# Patient Record
Sex: MALE | Race: ASIAN | NOT HISPANIC OR LATINO
[De-identification: names, ages, dates, MRNs, and addresses within clinical notes are randomized per-mention and may not be internally consistent; named-entity substitution may affect disease eponyms.]

---

## 2017-05-16 ENCOUNTER — NON-APPOINTMENT (OUTPATIENT)
Age: 72
End: 2017-05-16

## 2017-05-16 ENCOUNTER — APPOINTMENT (OUTPATIENT)
Dept: ELECTROPHYSIOLOGY | Facility: CLINIC | Age: 72
End: 2017-05-16

## 2017-05-16 VITALS — WEIGHT: 225 LBS | HEART RATE: 59 BPM | HEIGHT: 69 IN | BODY MASS INDEX: 33.33 KG/M2 | OXYGEN SATURATION: 96 %

## 2017-05-16 VITALS — SYSTOLIC BLOOD PRESSURE: 138 MMHG | DIASTOLIC BLOOD PRESSURE: 83 MMHG

## 2017-11-14 ENCOUNTER — NON-APPOINTMENT (OUTPATIENT)
Age: 72
End: 2017-11-14

## 2017-11-14 ENCOUNTER — APPOINTMENT (OUTPATIENT)
Dept: ELECTROPHYSIOLOGY | Facility: CLINIC | Age: 72
End: 2017-11-14
Payer: COMMERCIAL

## 2017-11-14 VITALS
BODY MASS INDEX: 33.33 KG/M2 | HEART RATE: 54 BPM | SYSTOLIC BLOOD PRESSURE: 157 MMHG | OXYGEN SATURATION: 98 % | HEIGHT: 69 IN | DIASTOLIC BLOOD PRESSURE: 89 MMHG | WEIGHT: 225 LBS | RESPIRATION RATE: 16 BRPM

## 2017-11-14 PROCEDURE — 99213 OFFICE O/P EST LOW 20 MIN: CPT

## 2017-11-14 PROCEDURE — 93000 ELECTROCARDIOGRAM COMPLETE: CPT

## 2018-05-15 ENCOUNTER — NON-APPOINTMENT (OUTPATIENT)
Age: 73
End: 2018-05-15

## 2018-05-15 ENCOUNTER — APPOINTMENT (OUTPATIENT)
Dept: ELECTROPHYSIOLOGY | Facility: CLINIC | Age: 73
End: 2018-05-15
Payer: COMMERCIAL

## 2018-05-15 VITALS
HEIGHT: 70 IN | WEIGHT: 223 LBS | HEART RATE: 52 BPM | SYSTOLIC BLOOD PRESSURE: 161 MMHG | OXYGEN SATURATION: 96 % | RESPIRATION RATE: 16 BRPM | DIASTOLIC BLOOD PRESSURE: 80 MMHG | BODY MASS INDEX: 31.92 KG/M2

## 2018-05-15 VITALS — DIASTOLIC BLOOD PRESSURE: 74 MMHG | SYSTOLIC BLOOD PRESSURE: 159 MMHG

## 2018-05-15 PROCEDURE — 93000 ELECTROCARDIOGRAM COMPLETE: CPT

## 2018-05-15 PROCEDURE — 99214 OFFICE O/P EST MOD 30 MIN: CPT

## 2018-09-05 ENCOUNTER — MEDICATION RENEWAL (OUTPATIENT)
Age: 73
End: 2018-09-05

## 2018-11-13 ENCOUNTER — APPOINTMENT (OUTPATIENT)
Dept: ELECTROPHYSIOLOGY | Facility: CLINIC | Age: 73
End: 2018-11-13
Payer: COMMERCIAL

## 2018-11-13 ENCOUNTER — NON-APPOINTMENT (OUTPATIENT)
Age: 73
End: 2018-11-13

## 2018-11-13 VITALS
HEART RATE: 63 BPM | OXYGEN SATURATION: 96 % | WEIGHT: 213 LBS | DIASTOLIC BLOOD PRESSURE: 78 MMHG | SYSTOLIC BLOOD PRESSURE: 162 MMHG | HEIGHT: 70 IN | BODY MASS INDEX: 30.49 KG/M2

## 2018-11-13 PROCEDURE — 93000 ELECTROCARDIOGRAM COMPLETE: CPT

## 2018-11-13 PROCEDURE — 99213 OFFICE O/P EST LOW 20 MIN: CPT

## 2018-11-13 NOTE — PHYSICAL EXAM
[General Appearance - Well Developed] : well developed [Normal Appearance] : normal appearance [Well Groomed] : well groomed [General Appearance - Well Nourished] : well nourished [No Deformities] : no deformities [General Appearance - In No Acute Distress] : no acute distress [Normal Conjunctiva] : the conjunctiva exhibited no abnormalities [Eyelids - No Xanthelasma] : the eyelids demonstrated no xanthelasmas [Normal Oral Mucosa] : normal oral mucosa [No Oral Pallor] : no oral pallor [No Oral Cyanosis] : no oral cyanosis [Normal Jugular Venous A Waves Present] : normal jugular venous A waves present [Normal Jugular Venous V Waves Present] : normal jugular venous V waves present [No Jugular Venous Mayorga A Waves] : no jugular venous mayorga A waves [Respiration, Rhythm And Depth] : normal respiratory rhythm and effort [Exaggerated Use Of Accessory Muscles For Inspiration] : no accessory muscle use [Auscultation Breath Sounds / Voice Sounds] : lungs were clear to auscultation bilaterally [Heart Sounds] : normal S1 and S2 [Murmurs] : no murmurs present [Abdomen Soft] : soft [Abdomen Tenderness] : non-tender [Abdomen Mass (___ Cm)] : no abdominal mass palpated [Abnormal Walk] : normal gait [Gait - Sufficient For Exercise Testing] : the gait was sufficient for exercise testing [Nail Clubbing] : no clubbing of the fingernails [Cyanosis, Localized] : no localized cyanosis [Petechial Hemorrhages (___cm)] : no petechial hemorrhages [Skin Color & Pigmentation] : normal skin color and pigmentation [] : no rash [No Venous Stasis] : no venous stasis [Skin Lesions] : no skin lesions [No Skin Ulcers] : no skin ulcer [No Xanthoma] : no  xanthoma was observed [Oriented To Time, Place, And Person] : oriented to person, place, and time [Affect] : the affect was normal [Mood] : the mood was normal [No Anxiety] : not feeling anxious [FreeTextEntry1] : irregular rate/rhythm

## 2018-11-13 NOTE — DISCUSSION/SUMMARY
[FreeTextEntry1] : In summary, Tyler Mendenhall is a 72y/o man with Hx of permanent atrial fibrillation on Eliquis who presents today for routine f/u. Admits doing well with no issues or complaints. Denies chest pain, palpitations, SOB, syncope or near syncope. Blood pressure elevated in office but states typically normal without need for medication. Discussed importance of weight loss, heart healthy diet, and sodium restriction for further HTN management and will f/u with Dr. Sanders further. Will continue Eliquis 5mg BID as prescribed, Rx sent as requested, and RTO for f/u in 6 months.  Advised patient to take blood pressure at home.  If BP continues high, may need anti HTN medication. \par \par Sincerely,\par \par Pipe Saravia MD

## 2018-11-13 NOTE — HISTORY OF PRESENT ILLNESS
[FreeTextEntry1] : Sofy Sanders MD\par \par I saw Tyler Mendenhall on November 13, 2018. As you know, he is a 72y/o man with Hx of permanent atrial fibrillation on Eliquis who presents today for routine f/u. Admits doing well with no issues or complaints. Denies chest pain, palpitations, SOB, syncope or near syncope.

## 2019-05-14 ENCOUNTER — APPOINTMENT (OUTPATIENT)
Dept: ELECTROPHYSIOLOGY | Facility: CLINIC | Age: 74
End: 2019-05-14
Payer: COMMERCIAL

## 2019-05-14 ENCOUNTER — NON-APPOINTMENT (OUTPATIENT)
Age: 74
End: 2019-05-14

## 2019-05-14 VITALS
BODY MASS INDEX: 32.93 KG/M2 | DIASTOLIC BLOOD PRESSURE: 80 MMHG | HEIGHT: 70 IN | WEIGHT: 230 LBS | OXYGEN SATURATION: 98 % | SYSTOLIC BLOOD PRESSURE: 160 MMHG | HEART RATE: 47 BPM

## 2019-05-14 PROCEDURE — 99214 OFFICE O/P EST MOD 30 MIN: CPT

## 2019-05-14 PROCEDURE — 93000 ELECTROCARDIOGRAM COMPLETE: CPT

## 2019-05-14 NOTE — DISCUSSION/SUMMARY
[FreeTextEntry1] : In summary, Tyler Mendenhall is a 75y/o man with Hx of permanent atrial fibrillation on Eliquis who presents today for routine f/u. Admits doing well with no issues or complaints. Denies chest pain, palpitations, SOB, syncope or near syncope. Blood pressure elevated in office but states typically normal without need for medication. Discussed importance of weight loss, heart healthy diet, and sodium restriction for further HTN management and will f/u with Dr. Sanders further. Will continue Eliquis 5mg BID as prescribed, Rx sent as requested, and RTO for f/u in 6 months.  Advised patient to take blood pressure at home.  If BP continues high, may need anti HTN medication. \par \par Sincerely,\par \par Pipe Saravia MD

## 2019-05-14 NOTE — HISTORY OF PRESENT ILLNESS
[FreeTextEntry1] : Sofy Sanders MD\par \par I saw Tyler Mendenhall on May 14, 2019. As you know, he is a 75y/o man with Hx of permanent atrial fibrillation on Eliquis who presents today for routine f/u. Admits doing well with no issues or complaints. Denies chest pain, palpitations, SOB, syncope or near syncope.

## 2019-08-02 ENCOUNTER — APPOINTMENT (OUTPATIENT)
Dept: VASCULAR SURGERY | Facility: CLINIC | Age: 74
End: 2019-08-02
Payer: COMMERCIAL

## 2019-08-02 VITALS
TEMPERATURE: 97 F | HEIGHT: 70 IN | SYSTOLIC BLOOD PRESSURE: 175 MMHG | BODY MASS INDEX: 32.93 KG/M2 | WEIGHT: 230 LBS | DIASTOLIC BLOOD PRESSURE: 72 MMHG | HEART RATE: 46 BPM

## 2019-08-02 PROCEDURE — 93970 EXTREMITY STUDY: CPT

## 2019-08-02 PROCEDURE — 99202 OFFICE O/P NEW SF 15 MIN: CPT

## 2019-11-22 ENCOUNTER — NON-APPOINTMENT (OUTPATIENT)
Age: 74
End: 2019-11-22

## 2019-11-22 ENCOUNTER — APPOINTMENT (OUTPATIENT)
Dept: ELECTROPHYSIOLOGY | Facility: CLINIC | Age: 74
End: 2019-11-22
Payer: COMMERCIAL

## 2019-11-22 VITALS
HEIGHT: 70 IN | SYSTOLIC BLOOD PRESSURE: 148 MMHG | WEIGHT: 229 LBS | BODY MASS INDEX: 32.78 KG/M2 | DIASTOLIC BLOOD PRESSURE: 59 MMHG | OXYGEN SATURATION: 96 % | HEART RATE: 56 BPM

## 2019-11-22 PROCEDURE — 99213 OFFICE O/P EST LOW 20 MIN: CPT

## 2019-11-22 PROCEDURE — 93000 ELECTROCARDIOGRAM COMPLETE: CPT

## 2019-11-22 RX ORDER — FEXOFENADINE HYDROCHLORIDE 180 MG/1
180 TABLET, FILM COATED ORAL DAILY
Qty: 30 | Refills: 0 | Status: DISCONTINUED | COMMUNITY
Start: 2017-05-16 | End: 2019-11-22

## 2019-11-22 NOTE — PHYSICAL EXAM
[General Appearance - Well Developed] : well developed [Normal Appearance] : normal appearance [Well Groomed] : well groomed [General Appearance - Well Nourished] : well nourished [No Deformities] : no deformities [General Appearance - In No Acute Distress] : no acute distress [Normal Conjunctiva] : the conjunctiva exhibited no abnormalities [Eyelids - No Xanthelasma] : the eyelids demonstrated no xanthelasmas [Normal Oral Mucosa] : normal oral mucosa [No Oral Pallor] : no oral pallor [No Oral Cyanosis] : no oral cyanosis [Normal Jugular Venous A Waves Present] : normal jugular venous A waves present [Normal Jugular Venous V Waves Present] : normal jugular venous V waves present [No Jugular Venous Mayorga A Waves] : no jugular venous mayorga A waves [Respiration, Rhythm And Depth] : normal respiratory rhythm and effort [Exaggerated Use Of Accessory Muscles For Inspiration] : no accessory muscle use [Auscultation Breath Sounds / Voice Sounds] : lungs were clear to auscultation bilaterally [Heart Rate And Rhythm] : heart rate and rhythm were normal [Heart Sounds] : normal S1 and S2 [Murmurs] : no murmurs present [Abdomen Soft] : soft [Abdomen Tenderness] : non-tender [Abdomen Mass (___ Cm)] : no abdominal mass palpated [Abnormal Walk] : normal gait [Gait - Sufficient For Exercise Testing] : the gait was sufficient for exercise testing [Nail Clubbing] : no clubbing of the fingernails [Cyanosis, Localized] : no localized cyanosis [Petechial Hemorrhages (___cm)] : no petechial hemorrhages [Skin Color & Pigmentation] : normal skin color and pigmentation [] : no rash [No Venous Stasis] : no venous stasis [Skin Lesions] : no skin lesions [No Skin Ulcers] : no skin ulcer [No Xanthoma] : no  xanthoma was observed [Oriented To Time, Place, And Person] : oriented to person, place, and time [Affect] : the affect was normal [Mood] : the mood was normal [No Anxiety] : not feeling anxious

## 2019-11-23 NOTE — HISTORY OF PRESENT ILLNESS
[FreeTextEntry1] : Sofy Sanders MD\par \par Mr. Scott is a 75y/o man with Hx of permanent atrial fibrillation on Eliquis who presents today for routine f/u. Admits doing well with no issues or complaints. Denies chest pain, palpitations, SOB, syncope or near syncope.  EKG today Afib rate 50.

## 2019-11-23 NOTE — DISCUSSION/SUMMARY
[FreeTextEntry1] : Mr. Scott is a 73y/o man with Hx of permanent atrial fibrillation on Eliquis who presents today for routine f/u. Admits doing well with no issues or complaints. Denies chest pain, palpitations, SOB, syncope or near syncope.  EKG today Afib rate 50.  I recommended that he continue anticoagulation and follow-up in 6 months.  His rate is well controlled and he remains asymptomatic. \par \par Sincerely,\par \par Pipe Saravia MD

## 2019-12-06 ENCOUNTER — TRANSCRIPTION ENCOUNTER (OUTPATIENT)
Age: 74
End: 2019-12-06

## 2020-02-13 ENCOUNTER — RX RENEWAL (OUTPATIENT)
Age: 75
End: 2020-02-13

## 2020-05-22 ENCOUNTER — APPOINTMENT (OUTPATIENT)
Dept: ELECTROPHYSIOLOGY | Facility: CLINIC | Age: 75
End: 2020-05-22

## 2020-05-22 ENCOUNTER — APPOINTMENT (OUTPATIENT)
Dept: ELECTROPHYSIOLOGY | Facility: CLINIC | Age: 75
End: 2020-05-22
Payer: COMMERCIAL

## 2020-05-22 PROCEDURE — 99201 OFFICE OUTPATIENT NEW 10 MINUTES: CPT | Mod: 95

## 2020-05-22 NOTE — HISTORY OF PRESENT ILLNESS
[Home] : at home, [unfilled] , at the time of the visit. [Medical Office: ([facility])___] : at the medical office located in  [Verbal consent obtained from patient] : the patient, [unfilled] [Time Spent: ___ minutes] : I have spent [unfilled] minutes with the patient on the telephone [FreeTextEntry1] : Goes for walks, keeps walking no changes, but gets tired a little more easily. Patient's wife a radiology nurse at [facility] [employer] [facility] [employer] and takes patient's blood and will process at [facility] lab. He periodically travels to [place] where he has a place to stay.  He walks about an hour per day.  Neither he nor his wife developed COVID.  His weight has remained constant. We would like him to lose weight. 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 He likes to garden and spends his time now in the garden.  He is taking Eliquis and has no evidence of bleeding.  He follows with Dr. Sofy Sanders. I think his fatigue is in part related to his weight and atrial fibrillation.  Again, encourage weight loss.

## 2020-08-07 ENCOUNTER — APPOINTMENT (OUTPATIENT)
Dept: VASCULAR SURGERY | Facility: CLINIC | Age: 75
End: 2020-08-07

## 2020-11-23 ENCOUNTER — APPOINTMENT (OUTPATIENT)
Dept: VASCULAR SURGERY | Facility: CLINIC | Age: 75
End: 2020-11-23
Payer: COMMERCIAL

## 2020-11-23 PROCEDURE — 93970 EXTREMITY STUDY: CPT

## 2020-12-01 ENCOUNTER — APPOINTMENT (OUTPATIENT)
Dept: VASCULAR SURGERY | Facility: CLINIC | Age: 75
End: 2020-12-01
Payer: COMMERCIAL

## 2020-12-01 PROCEDURE — 99442: CPT

## 2021-04-13 ENCOUNTER — APPOINTMENT (OUTPATIENT)
Dept: ELECTROPHYSIOLOGY | Facility: CLINIC | Age: 76
End: 2021-04-13
Payer: COMMERCIAL

## 2021-04-13 ENCOUNTER — NON-APPOINTMENT (OUTPATIENT)
Age: 76
End: 2021-04-13

## 2021-04-13 VITALS
OXYGEN SATURATION: 100 % | WEIGHT: 229 LBS | BODY MASS INDEX: 32.86 KG/M2 | HEART RATE: 59 BPM | DIASTOLIC BLOOD PRESSURE: 71 MMHG | TEMPERATURE: 98.2 F | SYSTOLIC BLOOD PRESSURE: 157 MMHG | RESPIRATION RATE: 16 BRPM

## 2021-04-13 PROCEDURE — 93000 ELECTROCARDIOGRAM COMPLETE: CPT

## 2021-04-13 PROCEDURE — 99072 ADDL SUPL MATRL&STAF TM PHE: CPT

## 2021-04-13 PROCEDURE — 99213 OFFICE O/P EST LOW 20 MIN: CPT

## 2021-04-13 NOTE — HISTORY OF PRESENT ILLNESS
[FreeTextEntry1] : Sofy Sanders MD\par \par Tyler Mendenhall is a 75y/o man with Hx of permanent atrial fibrillation on Eliquis who presents today for routine f/u. Back in March 2021, was hospitalized at [place] for RLE bruising/bleeding. Thought to be venous and has now resolved. Still on Eliquis. Following with vascular. From a cardiac perspective, he has been doing well. Still having some dyspnea on exertion but staying active. Denies chest pain, palpitations, SOB, syncope or near syncope. Recently saw Dr. Sanders and was noted to be bradycardic on EKG, heart rate 48 bpm at that time. Denies any dizziness, feeling lightheaded, and no syncope or near syncope.

## 2021-04-13 NOTE — DISCUSSION/SUMMARY
[FreeTextEntry1] : Tyler Mendenhall is a 75y/o man with Hx of permanent atrial fibrillation on Eliquis who presents today for routine f/u. \par \par Impression:\par \par 1. Permanent afib: EKG performed today to assess for presence of afib and reveals afib with ventricular response rates in the 50s. Review of EKG with Dr. Pressley and reveals afib with rates in the 40s. Denies correlating symptoms. No dizziness or feeling lightheaded. Nos syncope or near syncope. Remains off AV nodals. Discussed s/s of symptomatic bradycardia and indication for PPM placement. For now, no need for PPM. \par \par 2. Venous insufficiency: recent hospitalization for RLE venous bleed. Will f/u with vascular. \par \par 3. High TSH. Patient will be seeing endocrinologist for further evaluation. \par \par Will continue f/u with Cardiologist and may RTO as needed or if any new or worsening symptoms or findings occur.\par \par Sincerely,\par \par Pipe Saravia MD

## 2021-05-26 ENCOUNTER — APPOINTMENT (OUTPATIENT)
Dept: ENDOCRINOLOGY | Facility: CLINIC | Age: 76
End: 2021-05-26

## 2021-06-10 ENCOUNTER — RX RENEWAL (OUTPATIENT)
Age: 76
End: 2021-06-10

## 2021-06-17 ENCOUNTER — APPOINTMENT (OUTPATIENT)
Dept: VASCULAR SURGERY | Facility: CLINIC | Age: 76
End: 2021-06-17
Payer: COMMERCIAL

## 2021-06-17 VITALS
HEIGHT: 70 IN | SYSTOLIC BLOOD PRESSURE: 172 MMHG | WEIGHT: 229 LBS | BODY MASS INDEX: 32.78 KG/M2 | TEMPERATURE: 97 F | HEART RATE: 60 BPM | DIASTOLIC BLOOD PRESSURE: 79 MMHG

## 2021-06-17 PROCEDURE — 93970 EXTREMITY STUDY: CPT

## 2021-06-17 PROCEDURE — 99214 OFFICE O/P EST MOD 30 MIN: CPT

## 2021-06-17 PROCEDURE — 99204 OFFICE O/P NEW MOD 45 MIN: CPT

## 2021-06-21 ENCOUNTER — NON-APPOINTMENT (OUTPATIENT)
Age: 76
End: 2021-06-21

## 2021-06-24 ENCOUNTER — APPOINTMENT (OUTPATIENT)
Dept: WOUND CARE | Facility: CLINIC | Age: 76
End: 2021-06-24
Payer: COMMERCIAL

## 2021-06-24 VITALS — DIASTOLIC BLOOD PRESSURE: 74 MMHG | TEMPERATURE: 94.5 F | HEART RATE: 59 BPM | SYSTOLIC BLOOD PRESSURE: 136 MMHG

## 2021-06-24 VITALS — BODY MASS INDEX: 32.78 KG/M2 | WEIGHT: 229 LBS | TEMPERATURE: 96.5 F | HEIGHT: 70 IN

## 2021-06-24 PROCEDURE — 99204 OFFICE O/P NEW MOD 45 MIN: CPT | Mod: 25

## 2021-06-24 PROCEDURE — 99244 OFF/OP CNSLTJ NEW/EST MOD 40: CPT | Mod: 25

## 2021-06-24 PROCEDURE — 11042 DBRDMT SUBQ TIS 1ST 20SQCM/<: CPT

## 2021-06-24 PROCEDURE — 99214 OFFICE O/P EST MOD 30 MIN: CPT | Mod: 25

## 2021-06-25 ENCOUNTER — APPOINTMENT (OUTPATIENT)
Dept: VASCULAR SURGERY | Facility: CLINIC | Age: 76
End: 2021-06-25

## 2021-06-29 NOTE — PHYSICAL EXAM
[Normal Breath Sounds] : Normal breath sounds [Normal Rate and Rhythm] : normal rate and rhythm [JVD] : no jugular venous distention  [2+] : left 2+ [Abdomen Tenderness] : ~T ~M No abdominal tenderness [Skin Ulcer] : ulcer [Alert] : alert [Oriented to Person] : oriented to person [Oriented to Place] : oriented to place [Oriented to Time] : oriented to time [Calm] : calm [de-identified] : nad [de-identified] : kwaku [de-identified] : rom intact [Please See PDF for [employer]] : Please See PDF for [employer].

## 2021-06-29 NOTE — HISTORY OF PRESENT ILLNESS
[FreeTextEntry1] : [Mr/Ms Fn Ln]   presents to the office with a wound for 3 months duration. \par The wound is located on  the right leg. The patient has complaints of  bleeding-.  h/o trauma\par The patient has been dressing the wound with  lido rogelio , antibiotic cream\par \par .  The patient denies fevers or chills. The patient has localized pain to the wound upon dressing changes. The patient has no other complaints or associated symptoms. HbA1c is ----.\par afib , eliquis, htn, horse rubio cream\par lost weight 200+

## 2021-06-29 NOTE — ASSESSMENT
[FreeTextEntry1] : 76 yr old m with leg ulcers, afib, on AC h/o bleeding venous ulcer\par  no cellulitis, no biopsy at this time\par wounds 3 cm sq each, trial conservative care, compression, potential skin sub candidate\par  datacomplexity-mod- lab, xr, old rec, test resultsreview,, visualize imagecptreview previous\par risk-high- tolerated surgery well minimal oozing\par us- no dvt, severe sup keyla insufficiency bilaterally, reflux in gsv, candidate for ablation, vi present in 2019\par has stockings interest in circaid\par shelley 2015 1.3 and 1.3\par \par medihoney wrap placed, patient doing his own care\par stockings ordered

## 2021-07-09 ENCOUNTER — APPOINTMENT (OUTPATIENT)
Dept: WOUND CARE | Facility: CLINIC | Age: 76
End: 2021-07-09
Payer: COMMERCIAL

## 2021-07-09 VITALS — BODY MASS INDEX: 32.78 KG/M2 | TEMPERATURE: 96 F | HEIGHT: 70 IN | WEIGHT: 229 LBS

## 2021-07-09 PROCEDURE — 99213 OFFICE O/P EST LOW 20 MIN: CPT

## 2021-07-09 RX ORDER — LIDOCAINE HYDROCHLORIDE 10 MG/ML
1 INJECTION, SOLUTION INFILTRATION; PERINEURAL
Qty: 50 | Refills: 0 | Status: COMPLETED | COMMUNITY
Start: 2021-07-09 | End: 2021-07-16

## 2021-07-09 RX ORDER — SODIUM BICARBONATE 84 MG/ML
8.4 INJECTION, SOLUTION INTRAVENOUS
Qty: 5 | Refills: 0 | Status: COMPLETED | COMMUNITY
Start: 2021-07-09 | End: 2021-07-16

## 2021-07-09 RX ORDER — ALPRAZOLAM 0.5 MG/1
0.5 TABLET ORAL
Qty: 1 | Refills: 0 | Status: COMPLETED | COMMUNITY
Start: 2021-07-09 | End: 2021-07-16

## 2021-07-16 ENCOUNTER — APPOINTMENT (OUTPATIENT)
Dept: VASCULAR SURGERY | Facility: CLINIC | Age: 76
End: 2021-07-16
Payer: COMMERCIAL

## 2021-07-16 ENCOUNTER — APPOINTMENT (OUTPATIENT)
Dept: WOUND CARE | Facility: CLINIC | Age: 76
End: 2021-07-16

## 2021-07-16 PROCEDURE — 36475 ENDOVENOUS RF 1ST VEIN: CPT | Mod: RT

## 2021-07-16 RX ORDER — LIDOCAINE HYDROCHLORIDE 10 MG/ML
1 INJECTION, SOLUTION INFILTRATION; PERINEURAL
Qty: 50 | Refills: 0 | Status: COMPLETED | COMMUNITY
Start: 2021-07-15 | End: 2021-07-16

## 2021-07-16 RX ORDER — SODIUM BICARBONATE 84 MG/ML
8.4 INJECTION, SOLUTION INTRAVENOUS
Qty: 5 | Refills: 0 | Status: COMPLETED | COMMUNITY
Start: 2021-07-15 | End: 2021-07-16

## 2021-07-16 RX ORDER — ALPRAZOLAM 0.5 MG/1
0.5 TABLET ORAL
Qty: 1 | Refills: 0 | Status: COMPLETED | COMMUNITY
Start: 2021-07-15 | End: 2021-07-16

## 2021-07-16 NOTE — PROCEDURE
[FreeTextEntry1] : right GSV RFA [FreeTextEntry3] : Procedural safety checklist and time out completed:\par Confirmed patient identification (Patient Name, , and/or medical record number including when possible affirmation by patient or parent/family/other).\par Confirmed procedure with the patient. Consent present, accurate and signed. \par Confirmed special equipment and supplies are present.\par Sterility confirmed. Position verified. \par Site/ side is marked and visible and confirmed. \par Procedure confirmed by consent. Accurate consent including side and site.\par Review of medical records, including venous ultrasound, noting correct procedure including site and side.\par MD/PA verifies presence and review of imaging studies and or written report of imaging studies.\par Agreement on the procedure to be performed\par Time out completed.\par All of the above has been confirmed by the team.\par All patient-specific concerns have been addressed. \par \par Indication: right lower extremity varicose veins with inflammation, leg pain, leg swelling, and leg cramping.  Venous insufficiency/ reflux.\par \par Procedure: radiofrequency ablation of the right great saphenous vein. \par 	\par Mr. RONNY NAVARRETE is a 76 year old M with a history of right lower extremity varicose veins previously seen in the office.  Ultrasound examination demonstrated venous insufficiency. A trial of compression stockings, exercise, elevation, and pain medication was attempted without relief and definitive treatment with radiofrequency ablation was offered. \par \par The patient has come for radiofrequency ablation treatment of the right great saphenous vein.\par I have discussed the risks of the procedure at length with the patient. The risks discussed were inclusive of but not limited to infection, irritation at the site of infiltration of local anesthesia, and also rare risk of deep venous thrombosis and pulmonary emboli. The patient agrees to proceed with the procedure. \par The patient was escorted into the procedure room and a time out called.\par The entire limb was prepped and draped in sterile fashion. The RF fiber was placed on the sterile field and connected by a sterile cable. Actuation, temperature, and impedance testing were performed to ensure that all components were connected and operating properly. \par The patient was placed on the procedure table and local anesthesia was instilled in the skin overlying the access site. Under ultrasound guidance, the vein was punctured with a micropuncture needle, using the anterior wall technique. A guide wire was now introduced through the needle, and the needle was then exchanged over the guide wire for a 7F sheath. The guide wire was removed and the RF probe was then placed into the right great saphenous vein through the sheath and position confirmed using ultrasound guidance. After the RF probe position was verified by ultrasound, tumescent anesthesia consisting of normal saline, 1% lidocaine with 8.4% sodium bicarbonate was infiltrated, under ultrasound guidance, precisely into the perivenous compartment along the entire length of the vein until a “halo” of fluid was noted around the vein. After RF probe position was again confirmed with ultrasound imaging, RF energy was applied. The probe was gradually and carefully withdrawn at a rate of 6.5cm/20seconds. \par \par 3 cycles of RF performed using the 7 cm probe\par Total treatment time was 60 seconds.\par The total volume injected was 275 cc\par Treatment length was 13.5 cm and \par The probe is 3.5 cm from the SFJ.\par \par Estimated Blood Loss: minimal\par Repeat ultrasound of the treated vein was performed confirming successful treatment. The catheter and sheath were withdrawn and hemostasis established with direct pressure. After assuring hemostasis, a sterile 4x4 was placed on the access site and an ACE compression wrap was applied. Patient tolerated procedure well. Patient was given post-procedure instructions and follow up appointment was scheduled.\par \par \par

## 2021-07-23 ENCOUNTER — APPOINTMENT (OUTPATIENT)
Dept: VASCULAR SURGERY | Facility: CLINIC | Age: 76
End: 2021-07-23
Payer: COMMERCIAL

## 2021-07-23 PROCEDURE — 93971 EXTREMITY STUDY: CPT | Mod: 59

## 2021-07-23 PROCEDURE — 36475 ENDOVENOUS RF 1ST VEIN: CPT | Mod: LT

## 2021-07-23 PROCEDURE — 99072 ADDL SUPL MATRL&STAF TM PHE: CPT

## 2021-07-23 NOTE — PROCEDURE
[FreeTextEntry1] : left GSV RFA [FreeTextEntry3] : Mr. NAVARRETE is doing well s/p radiofrequency ablation of the right great saphenous vein. Venous ultrasound done today demonstrates successful closure of the R GSV without evidence of DVT. He presents for left GSV RFA today.\par  \par Procedural safety checklist and time out completed:\par Confirmed patient identification (Patient Name, , and/or medical record number including when possible affirmation by patient or parent/family/other.\par Confirmed procedure with the patient. Consent present, accurate and signed. \par Confirmed special equipment and supplies are present.\par Sterility confirmed. Position verified. \par Site/ side is marked and visible and confirmed. \par Procedure confirmed by consent. Accurate consent including side and site.\par Review of medical records noting correct procedure including site and side.\par MD/PA verifies presence and review of imaging studies and or written report of imaging studies.\par Specify equipment are available for the planned procedure.\par MD/PA has marked the patient's procedural site and side.\par Agreement on the procedure to be performed\par Time out completed.\par All of the above has been confirmed by the team.\par All patient-specific concerns have been addressed\par \par Indication:left lower extremity varicose veins with inflammation, leg pain, leg swelling, and leg cramping.  Venous insufficiency/ reflux.\par \par Procedure: radiofrequency ablation of the left great saphenous vein. \par 	\par [Mr. RONNY NAVARRETE is a 76 year old M with a history of  left lower extremity varicose veins previously seen in the office.  Ultrasound examination demonstrated venous insufficiency. A trial of compression stockings, exercise, elevation, and pain medication was attempted without relief and definitive treatment with radiofrequency ablation was offered. \par \par The patient has come for radiofrequency ablation treatment of the left great saphenous vein. \par I have discussed the risks of the procedure at length with the patient. The risks discussed were inclusive of but not limited to infection, irritation at the site of infiltration of local anesthesia, and also rare risk of deep venous thrombosis and pulmonary emboli. The patient agrees to proceed with the procedure. \par The patient was escorted into the procedure room and a time out called.\par The entire limb was prepped and draped in sterile fashion. The RF fiber was placed on the sterile field and connected by a sterile cable. Actuation, temperature, and impedance testing were performed to ensure that all components were connected and operating properly. \par The patient was placed on the procedure table and local anesthesia was instilled in the skin overlying the access site. Under ultrasound guidance, the vein was punctured with a micropuncture needle, using the anterior wall technique. A guide wire was now introduced through the needle, and the needle was then exchanged over the guide wire for a 7F sheath. The guide wire was removed and the RF probe was then placed into the left great saphenous vein through the sheath and position confirmed using ultrasound guidance. After the RF probe position was verified by ultrasound, tumescent anesthesia consisting of normal saline, 1% lidocaine with 8.4% sodium bicarbonate was infiltrated, under ultrasound guidance, precisely into the perivenous compartment along the entire length of the vein until a “halo” of fluid was noted around the vein. After RF probe position was again confirmed with ultrasound imaging, RF energy was applied. The probe was gradually and carefully withdrawn at a rate of 6.5cm/20seconds. \par \par The total volume injected was 450 cc. \par Total treatment time was 120 seconds.\par Treatment length was 33 cm and 6 cycles of RF performed using the 7 cm probe. \par The probe is 3.6 cm from the SFJ. \par \par Estimated Blood Loss: minimal\par Repeat ultrasound of the treated vein was performed confirming successful treatment. The catheter and sheath were withdrawn and hemostasis established with direct pressure. After assuring hemostasis, a sterile 4x4 was placed on the access site and an ACE compression wrap was applied. Patient tolerated procedure well. Patient was given post-procedure instructions and follow up appointment was scheduled.\par \par

## 2021-07-26 NOTE — PHYSICAL EXAM
[Normal Breath Sounds] : Normal breath sounds [Normal Rate and Rhythm] : normal rate and rhythm [2+] : left 2+ [Skin Ulcer] : ulcer [Alert] : alert [Oriented to Person] : oriented to person [Oriented to Place] : oriented to place [Oriented to Time] : oriented to time [Calm] : calm [Please See PDF for Tissue Analytics] : Please See PDF for [employer]. [JVD] : no jugular venous distention  [Abdomen Tenderness] : ~T ~M No abdominal tenderness [de-identified] : nad [de-identified] : kwaku [de-identified] : rom intact

## 2021-07-26 NOTE — ASSESSMENT
[FreeTextEntry1] : 76 yr old m with leg ulcers, afib, on AC h/o bleeding venous ulcer\par  no cellulitis, no biopsy at this time\par wounds 3 cm sq each, trial conservative care, compression, potential skin sub candidate\par  datacomplexity-mod- lab, xr, old rec, test resultsreview,, visualize imagecptreview previous\par risk-high- tolerated surgery well minimal oozing\par us- no dvt, severe sup keyla insufficiency bilaterally, reflux in gsv, candidate for ablation, vi present in 2019\par has stockings interest in circaid\par shelley 2015 1.3 and 1.3\par \par mupirocin ordered\par  wrap placed, patient doing his own care\par stockings ordered

## 2021-07-28 ENCOUNTER — APPOINTMENT (OUTPATIENT)
Dept: VASCULAR SURGERY | Facility: CLINIC | Age: 76
End: 2021-07-28
Payer: COMMERCIAL

## 2021-07-28 ENCOUNTER — APPOINTMENT (OUTPATIENT)
Dept: WOUND CARE | Facility: CLINIC | Age: 76
End: 2021-07-28
Payer: COMMERCIAL

## 2021-07-28 VITALS
HEART RATE: 52 BPM | DIASTOLIC BLOOD PRESSURE: 70 MMHG | TEMPERATURE: 96.3 F | RESPIRATION RATE: 16 BRPM | SYSTOLIC BLOOD PRESSURE: 202 MMHG

## 2021-07-28 VITALS — TEMPERATURE: 97.1 F

## 2021-07-28 PROCEDURE — 99072 ADDL SUPL MATRL&STAF TM PHE: CPT

## 2021-07-28 PROCEDURE — 99212 OFFICE O/P EST SF 10 MIN: CPT

## 2021-07-28 PROCEDURE — 93971 EXTREMITY STUDY: CPT

## 2021-07-28 NOTE — PHYSICAL EXAM
[Normal Breath Sounds] : Normal breath sounds [Normal Rate and Rhythm] : normal rate and rhythm [2+] : left 2+ [Skin Ulcer] : ulcer [Alert] : alert [Oriented to Person] : oriented to person [Oriented to Place] : oriented to place [Oriented to Time] : oriented to time [Calm] : calm [Please See PDF for Tissue Analytics] : Please See PDF for [employer]. [JVD] : no jugular venous distention  [Abdomen Tenderness] : ~T ~M No abdominal tenderness [de-identified] : nad [de-identified] : kwaku [de-identified] : rom intact

## 2021-07-28 NOTE — ASSESSMENT
[FreeTextEntry1] : 76 yr old m with leg ulcers, afib, on AC h/o bleeding venous ulcer\par \par shelley 2015 1.3 and 1.3\par Patient s/p RFA in both legs in july\par Right lateral wound healed\par Right medial wound smaller, mupirocin applied, gauze, myah, ace\par mupirocin ordered\par  wrap placed, patient doing his own care\par patient wear stockings

## 2021-07-28 NOTE — HISTORY OF PRESENT ILLNESS
[FreeTextEntry1] : [Mr/Ms Fn Ln]   presents to the office with a wound for 3 months duration. \par The wound is located on  the right leg. The patient has complaints of  bleeding-.  h/o trauma\par The patient has been dressing the wound with  lido rogelio , antibiotic cream\par \par .  The patient denies fevers or chills. The patient has localized pain to the wound upon dressing changes. The patient has no other complaints or associated symptoms. 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

## 2021-08-03 RX ORDER — MUPIROCIN 2 G/100G
2 CREAM TOPICAL
Qty: 1 | Refills: 0 | Status: DISCONTINUED | COMMUNITY
Start: 2021-08-03 | End: 2021-08-03

## 2021-08-18 ENCOUNTER — APPOINTMENT (OUTPATIENT)
Dept: WOUND CARE | Facility: CLINIC | Age: 76
End: 2021-08-18

## 2021-09-29 ENCOUNTER — APPOINTMENT (OUTPATIENT)
Dept: GASTROENTEROLOGY | Facility: CLINIC | Age: 76
End: 2021-09-29
Payer: COMMERCIAL

## 2021-09-29 VITALS
HEIGHT: 70 IN | OXYGEN SATURATION: 99 % | SYSTOLIC BLOOD PRESSURE: 120 MMHG | RESPIRATION RATE: 16 BRPM | DIASTOLIC BLOOD PRESSURE: 70 MMHG | WEIGHT: 219 LBS | TEMPERATURE: 98 F | HEART RATE: 55 BPM | BODY MASS INDEX: 31.35 KG/M2

## 2021-09-29 DIAGNOSIS — Z12.11 ENCOUNTER FOR SCREENING FOR MALIGNANT NEOPLASM OF COLON: ICD-10-CM

## 2021-09-29 PROCEDURE — 99202 OFFICE O/P NEW SF 15 MIN: CPT

## 2021-09-29 NOTE — HISTORY OF PRESENT ILLNESS
[FreeTextEntry1] : Office consultation on 9/29/2021.\par \par The patient is a 76-year-old man evaluated for consultation in preparation for a screening colonoscopy.  PMD: Dr. Sofy Ward.\par \par The patient has 1 formed bowel movement daily without any complaints of diarrhea, constipation, change in bowel habit or rectal bleeding.\par \par Appetite and weight are stable.  Denies complaints of dysphagia, heartburn, nausea, vomiting or abdominal pain.\par \par Patient reports no history of digestive illness.  The patient has never had a colonoscopy.  Family history of colon cancer is not reported.

## 2021-09-29 NOTE — ASSESSMENT
[FreeTextEntry1] : Impression:\par \par #1 colon cancer screening–rule out colonic neoplasm.  The patient is currently asymptomatic from a GI standpoint.  The patient is at average risk.\par \par #2 diabetes mellitus.\par \par #3 atrial fibrillation.\par \par #4 chronic kidney disease.\par \par #5 obesity–BMI 31.42.\par \par #6 hypothyroid.\par \par #7 varicose veins–history of lower extremity ulcer and associated bleeding.

## 2021-09-29 NOTE — PHYSICAL EXAM
[General Appearance - Alert] : alert [General Appearance - In No Acute Distress] : in no acute distress [General Appearance - Well Developed] : well developed [General Appearance - Well-Appearing] : healthy appearing [Sclera] : the sclera and conjunctiva were normal [Neck Appearance] : the appearance of the neck was normal [Respiration, Rhythm And Depth] : normal respiratory rhythm and effort [Exaggerated Use Of Accessory Muscles For Inspiration] : no accessory muscle use [Auscultation Breath Sounds / Voice Sounds] : lungs were clear to auscultation bilaterally [Heart Rate And Rhythm] : heart rate was normal and rhythm regular [Heart Sounds] : normal S1 and S2 [Heart Sounds Gallop] : no gallops [Murmurs] : no murmurs [Heart Sounds Pericardial Friction Rub] : no pericardial rub [Edema] : there was no peripheral edema [Abdomen Soft] : soft [Abdomen Tenderness] : non-tender [] : no hepato-splenomegaly [Abdomen Mass (___ Cm)] : no abdominal mass palpated [Abdomen Hernia] : no hernia was discovered [No CVA Tenderness] : no ~M costovertebral angle tenderness [Abnormal Walk] : normal gait [Skin Color & Pigmentation] : normal skin color and pigmentation [Oriented To Time, Place, And Person] : oriented to person, place, and time [Impaired Insight] : insight and judgment were intact [Affect] : the affect was normal [Mood] : the mood was normal

## 2021-09-29 NOTE — REASON FOR VISIT
[Consultation] : a consultation visit [Spouse] : spouse [FreeTextEntry1] : in preparation for a screening colonoscopy.

## 2021-09-29 NOTE — CONSULT LETTER
[Dear  ___] : Dear  [unfilled], [Consult Letter:] : I had the pleasure of evaluating your patient, [unfilled]. [( Thank you for referring [unfilled] for consultation for _____ )] : Thank you for referring [unfilled] for consultation for [unfilled] [Please see my note below.] : Please see my note below. [Consult Closing:] : Thank you very much for allowing me to participate in the care of this patient.  If you have any questions, please do not hesitate to contact me. [Sincerely,] : Sincerely, [FreeTextEntry2] : Dr. Farzaneh Ward [FreeTextEntry3] : Tejas Eng MD

## 2021-10-01 ENCOUNTER — APPOINTMENT (OUTPATIENT)
Dept: ENDOCRINOLOGY | Facility: CLINIC | Age: 76
End: 2021-10-01
Payer: COMMERCIAL

## 2021-10-01 VITALS
WEIGHT: 217 LBS | HEIGHT: 70 IN | BODY MASS INDEX: 31.07 KG/M2 | TEMPERATURE: 98.1 F | DIASTOLIC BLOOD PRESSURE: 70 MMHG | HEART RATE: 57 BPM | SYSTOLIC BLOOD PRESSURE: 140 MMHG | RESPIRATION RATE: 16 BRPM | OXYGEN SATURATION: 99 %

## 2021-10-01 LAB
GLUCOSE BLDC GLUCOMTR-MCNC: 131
HBA1C MFR BLD HPLC: 6.6

## 2021-10-01 PROCEDURE — 99204 OFFICE O/P NEW MOD 45 MIN: CPT | Mod: 25

## 2021-10-01 PROCEDURE — 82962 GLUCOSE BLOOD TEST: CPT

## 2021-10-01 PROCEDURE — 83036 HEMOGLOBIN GLYCOSYLATED A1C: CPT | Mod: QW

## 2021-10-01 RX ORDER — METFORMIN HYDROCHLORIDE 1000 MG/1
1000 TABLET, COATED ORAL
Qty: 1 | Refills: 5 | Status: DISCONTINUED | COMMUNITY
End: 2021-10-01

## 2021-10-01 NOTE — PHYSICAL EXAM
[Alert] : alert [Well Nourished] : well nourished [Obese] : obese [No Acute Distress] : no acute distress [Well Developed] : well developed [Normal Sclera/Conjunctiva] : normal sclera/conjunctiva [EOMI] : extra ocular movement intact [No Proptosis] : no proptosis [Normal Oropharynx] : the oropharynx was normal [Thyroid Not Enlarged] : the thyroid was not enlarged [No Thyroid Nodules] : no palpable thyroid nodules [No Respiratory Distress] : no respiratory distress [No Accessory Muscle Use] : no accessory muscle use [Clear to Auscultation] : lungs were clear to auscultation bilaterally [Normal S1, S2] : normal S1 and S2 [Normal Rate] : heart rate was normal [Regular Rhythm] : with a regular rhythm [No Edema] : no peripheral edema [Pedal Pulses Normal] : the pedal pulses are present [Normal Bowel Sounds] : normal bowel sounds [Not Tender] : non-tender [Not Distended] : not distended [Soft] : abdomen soft [Normal Anterior Cervical Nodes] : no anterior cervical lymphadenopathy [Normal Posterior Cervical Nodes] : no posterior cervical lymphadenopathy [No Spinal Tenderness] : no spinal tenderness [Spine Straight] : spine straight [No Stigmata of Cushings Syndrome] : no stigmata of Cushings Syndrome [Normal Gait] : normal gait [Normal Strength/Tone] : muscle strength and tone were normal [No Rash] : no rash [Normal] : normal [Full ROM] : with full range of motion [Normal Reflexes] : deep tendon reflexes were 2+ and symmetric [No Tremors] : no tremors [Oriented x3] : oriented to person, place, and time [Acanthosis Nigricans] : no acanthosis nigricans [Diminished Throughout Both Feet] : normal tactile sensation with monofilament testing throughout both feet

## 2021-10-01 NOTE — HISTORY OF PRESENT ILLNESS
[FreeTextEntry1] : Mr. RONNY NAVARRETE is 76 year old man with history of Type 2 DM diagnosed \par He was following with Dr. Fernández but Dr. Fernández no longer takes his insurance therefore switching. \par He was diagnosed with Type 2 DM for about 2012.  \par Past medication for DM:\par He doesn't recall completely \par He is currently on metformin 1000mg bid but he stopped the medication because it ran out\par He was started on Farxiga 5mg once daily \par He monitor his glucose twice a week. \par Checking glucose in the morning, usually 110-130mg/dl.  \par His A1C is 6.6%. \par \par B: Usually has soup for breakfast, chicken or carrot soup. \par S: most of the time no snack between breakfast and dinner\par L: Sometimes skips lunch, if he does eat lunch, usually have fruits, apple, pear or plum\par D: A little rice, salad.   \par \par Lives at home with his wife. \par \par He is a retired .  He taught science. \par \par He states that he does not have a history of hypertension.  He denies any history of hyperlipidemia. \par \par He follows up with ophthalmologist once a year, he states that he has no history of diabetic retinoathy.  \par He follows up with podiatrist as well.  \par \par   no

## 2021-10-01 NOTE — DATA REVIEWED
[FreeTextEntry1] :  08- 10:29 Order Number  [id number]  \par \par  Sodium, Serum 139    mmol/L  135 - 145 Final      \par  Potassium, Serum 5.4  High mmol/L  3.5 - 5.3 Final SPECIMEN NOT HEMOLYZED    \par  Chloride, Serum 104    mmol/L  98 - 107 Final      \par  Carbon Dioxide, Serum 22    mmol/L  22 - 31 Final      \par  Anion Gap, Serum 13    mmol/L  7 - 14 Final      \par  Blood Urea Nitrogen, Serum 24  High mg/dL  7 - 23 Final      \par  Creatinine, Serum 1.53  High mg/dL  0.50 - 1.30 Final      \par  Glucose, Serum 84    mg/dL  70 - 99 Final      \par  Calcium, Total Serum 9.3    mg/dL  8.4 - 10.5 Final      \par  eGFR if Non African American 44  Low mL/min/1.73M2  See_Comment Final The units for eGFR are ml/min/1.73m2 (normalized body    \par  Protein Total, Serum 7.5    g/dL  6.0 - 8.3 Final      \par  Albumin, Serum 3.9    g/dL  3.3 - 5.0 Final      \par  Bilirubin Total, Serum <0.2    mg/dL  0.2 - 1.2 Final      \par  Alkaline Phosphatase, Serum 66    U/L  40 - 120 Final      \par  Aspartate Aminotransferase (AST/SGOT) 21    U/L  4 - 40 Final      \par  Alanine Aminotransferase (ALT/SGPT) 10    U/L  4 - 41 Final   \par  \par

## 2021-10-01 NOTE — ASSESSMENT
[Diabetes Foot Care] : diabetes foot care [Long Term Vascular Complications] : long term vascular complications of diabetes [Carbohydrate Consistent Diet] : carbohydrate consistent diet [Importance of Diet and Exercise] : importance of diet and exercise to improve glycemic control, achieve weight loss and improve cardiovascular health [Exercise/Effect on Glucose] : exercise/effect on glucose [Hypoglycemia Management] : hypoglycemia management [Glucagon Use] : glucagon use [Ketone Testing] : ketone testing [Self Monitoring of Blood Glucose] : self monitoring of blood glucose [Insulin Self-Administration] : insulin self-administration [Sick-Day Management] : sick-day management [Retinopathy Screening] : Patient was referred to ophthalmology for retinopathy screening [FreeTextEntry1] : Mr. RONNY NAVARRETE is a 76 year old man with history of Type 2 DM, here to establish care, A1C at goal of 6.6% today, with CKD stage 3A, non known hypertension, no known hyperlipdiemia, hypothyroidism, here to establish endocrine visit.  He was following up with Dr. Fernández but transferring care here as Dr. Fernández is no longer on his insurance network.  \par \par 1.  Type 2 diabetes mellitus\par A1c is 6.6% today October 1, 2021 which is currently at goal for this gentleman.\par Patient did not bring a list of his medications but according to him he is taking Metformin at 1000 mg twice daily.  Farxiga 5 mg once daily.\par Given fluctuating EGFR.  Most recently between 44-52 mL/min/1.73M2 in June 2021.\par Recommend to reduce Metformin to 500 mg twice daily.  Prescription times to his pharmacy of choice.\par Recommend to continue with Farxiga 5 mg once daily.\par DM regimen:\par -Metformin  mg twice daily\par -Farxiga 5 mg once daily\par Counseled patient on some of the side effects of SGLT2 inhibitor including dehydration, vaginal yeast infections, yeast infections of the penis, ketoacidosis, as well as possible necrotizing fasciitis, a rare but serious bacterial infection to cause damage to the tissue under the skin in the area between and around the anus and genitals.\par Patient is up-to-date with his ophthalmologist.\par Patient is up-to-date with his podiatrist\par Foot exam is done today.  Notable for increased calluses bilaterally.\par Encourage patient to check glucose at least once daily, fasting and 2 hour post-prandially. \par \par 2.  Hypertension\par Patient states that he has no prior history of high blood pressure.\par BP today 140/70\par We will confirm his medication list with pharmacy.  I have sent a message to our pharmacist from [facility]. \par Check microalbumin/creatinine ratio today\par \par 3.  Cholesterol management\par LDL goal <70 mg/dL.\par \par \par \par

## 2021-10-19 ENCOUNTER — NON-APPOINTMENT (OUTPATIENT)
Age: 76
End: 2021-10-19

## 2021-10-19 ENCOUNTER — APPOINTMENT (OUTPATIENT)
Dept: ELECTROPHYSIOLOGY | Facility: CLINIC | Age: 76
End: 2021-10-19
Payer: COMMERCIAL

## 2021-10-19 VITALS
SYSTOLIC BLOOD PRESSURE: 159 MMHG | HEART RATE: 43 BPM | WEIGHT: 217 LBS | HEIGHT: 70 IN | OXYGEN SATURATION: 99 % | BODY MASS INDEX: 31.07 KG/M2 | DIASTOLIC BLOOD PRESSURE: 77 MMHG | RESPIRATION RATE: 16 BRPM | TEMPERATURE: 98 F

## 2021-10-19 PROCEDURE — 93000 ELECTROCARDIOGRAM COMPLETE: CPT

## 2021-10-19 PROCEDURE — 99213 OFFICE O/P EST LOW 20 MIN: CPT

## 2021-10-19 NOTE — DISCUSSION/SUMMARY
[FreeTextEntry1] : Impression:\par \par 1. Permanent afib: EKG performed today to assess for presence of afib and reveals afib with ventricular response rates in the 50s. Review of EKG with Dr. Pressley and reveals afib with rates in the 40s. Denies correlating symptoms. No dizziness or feeling lightheaded. No syncope or near syncope. Remains off AV nodals. Discussed s/s of symptomatic bradycardia and indication for PPM placement. For now, no need for PPM. Patient does have some dyspnea on exertion which could be weight related. \par \par 2. Venous insufficiency: recent hospitalization for RLE venous bleed. Will f/u with vascular. \par \par 3. High TSH. Patient will be seeing endocrinologist for further evaluation. \par \par Will continue f/u with Cardiologist and may RTO as needed or if any new or worsening symptoms or findings occur.\par \par Sincerely,\par \par Pipe Saravia MD

## 2021-10-19 NOTE — HISTORY OF PRESENT ILLNESS
[FreeTextEntry1] : Tyler Mendenhall is a 75y/o man with Hx of permanent atrial fibrillation on Eliquis who presents today for routine f/u. Back in March 2021, was hospitalized at [place] [facility] for RLE bruising/bleeding. Thought to be venous and has now resolved. Still on Eliquis. Following with vascular. From a cardiac perspective, he has been doing well. Still having some dyspnea on exertion but staying active. Denies chest pain, palpitations, SOB, syncope or near syncope. Recently saw Dr. Sanders and was noted to be bradycardic on EKG, heart rate 48 bpm at that time. Denies any dizziness, feeling lightheaded, and no syncope or near syncope.

## 2021-11-29 ENCOUNTER — OUTPATIENT (OUTPATIENT)
Dept: OUTPATIENT SERVICES | Facility: HOSPITAL | Age: 76
LOS: 1 days | End: 2021-11-29

## 2021-11-29 ENCOUNTER — APPOINTMENT (OUTPATIENT)
Dept: CV DIAGNOSITCS | Facility: HOSPITAL | Age: 76
End: 2021-11-29
Payer: COMMERCIAL

## 2021-11-29 ENCOUNTER — APPOINTMENT (OUTPATIENT)
Dept: DISASTER EMERGENCY | Facility: CLINIC | Age: 76
End: 2021-11-29

## 2021-11-29 DIAGNOSIS — Z01.818 ENCOUNTER FOR OTHER PREPROCEDURAL EXAMINATION: ICD-10-CM

## 2021-11-29 DIAGNOSIS — E11.9 TYPE 2 DIABETES MELLITUS WITHOUT COMPLICATIONS: ICD-10-CM

## 2021-11-29 DIAGNOSIS — I48.91 UNSPECIFIED ATRIAL FIBRILLATION: ICD-10-CM

## 2021-11-29 LAB — SARS-COV-2 N GENE NPH QL NAA+PROBE: NOT DETECTED

## 2021-11-29 PROCEDURE — 93306 TTE W/DOPPLER COMPLETE: CPT | Mod: 26

## 2021-11-30 ENCOUNTER — APPOINTMENT (OUTPATIENT)
Dept: VASCULAR SURGERY | Facility: CLINIC | Age: 76
End: 2021-11-30

## 2021-12-02 ENCOUNTER — APPOINTMENT (OUTPATIENT)
Dept: GASTROENTEROLOGY | Facility: HOSPITAL | Age: 76
End: 2021-12-02

## 2021-12-02 ENCOUNTER — NON-APPOINTMENT (OUTPATIENT)
Age: 76
End: 2021-12-02

## 2021-12-02 ENCOUNTER — OUTPATIENT (OUTPATIENT)
Dept: OUTPATIENT SERVICES | Facility: HOSPITAL | Age: 76
LOS: 1 days | Discharge: HOME | End: 2021-12-02
Payer: COMMERCIAL

## 2021-12-02 VITALS
RESPIRATION RATE: 20 BRPM | SYSTOLIC BLOOD PRESSURE: 145 MMHG | TEMPERATURE: 98 F | HEART RATE: 38 BPM | DIASTOLIC BLOOD PRESSURE: 52 MMHG | OXYGEN SATURATION: 98 % | WEIGHT: 214.95 LBS | HEIGHT: 70 IN

## 2021-12-02 VITALS
RESPIRATION RATE: 19 BRPM | OXYGEN SATURATION: 99 % | DIASTOLIC BLOOD PRESSURE: 62 MMHG | HEART RATE: 66 BPM | SYSTOLIC BLOOD PRESSURE: 148 MMHG

## 2021-12-02 DIAGNOSIS — Z12.11 ENCOUNTER FOR SCREENING FOR MALIGNANT NEOPLASM OF COLON: ICD-10-CM

## 2021-12-02 PROCEDURE — 45378 DIAGNOSTIC COLONOSCOPY: CPT

## 2021-12-02 PROCEDURE — 93010 ELECTROCARDIOGRAM REPORT: CPT

## 2021-12-02 NOTE — ASU PATIENT PROFILE, ADULT - FALL HARM RISK - UNIVERSAL INTERVENTIONS
Bed in lowest position, wheels locked, appropriate side rails in place/Call bell, personal items and telephone in reach/Instruct patient to call for assistance before getting out of bed or chair/Non-slip footwear when patient is out of bed/[place] to call system/Physically safe environment - no spills, clutter or unnecessary equipment/Purposeful Proactive Rounding/Room/bathroom lighting operational, light cord in reach

## 2021-12-07 ENCOUNTER — APPOINTMENT (OUTPATIENT)
Dept: VASCULAR SURGERY | Facility: CLINIC | Age: 76
End: 2021-12-07

## 2022-04-12 ENCOUNTER — NON-APPOINTMENT (OUTPATIENT)
Age: 77
End: 2022-04-12

## 2022-04-12 ENCOUNTER — APPOINTMENT (OUTPATIENT)
Dept: OPHTHALMOLOGY | Facility: CLINIC | Age: 77
End: 2022-04-12
Payer: COMMERCIAL

## 2022-04-12 ENCOUNTER — APPOINTMENT (OUTPATIENT)
Dept: ENDOCRINOLOGY | Facility: CLINIC | Age: 77
End: 2022-04-12
Payer: COMMERCIAL

## 2022-04-12 VITALS
WEIGHT: 220 LBS | DIASTOLIC BLOOD PRESSURE: 60 MMHG | BODY MASS INDEX: 31.5 KG/M2 | OXYGEN SATURATION: 99 % | HEIGHT: 70 IN | HEART RATE: 88 BPM | TEMPERATURE: 97.7 F | SYSTOLIC BLOOD PRESSURE: 124 MMHG

## 2022-04-12 PROCEDURE — 92004 COMPRE OPH EXAM NEW PT 1/>: CPT

## 2022-04-12 PROCEDURE — 99214 OFFICE O/P EST MOD 30 MIN: CPT

## 2022-04-12 NOTE — PHYSICAL EXAM

## 2022-04-12 NOTE — DATA REVIEWED
[FreeTextEntry1] :  08- 10:29 Order Number  [id number]  \par \par  Sodium, Serum 139    mmol/L  135 - 145 Final      \par  Potassium, Serum 5.4  High mmol/L  3.5 - 5.3 Final SPECIMEN NOT HEMOLYZED    \par  Chloride, Serum 104    mmol/L  98 - 107 Final      \par  Carbon Dioxide, Serum 22    mmol/L  22 - 31 Final      \par  Anion Gap, Serum 13    mmol/L  7 - 14 Final      \par  Blood Urea Nitrogen, Serum 24  High mg/dL  7 - 23 Final      \par  Creatinine, Serum 1.53  High mg/dL  0.50 - 1.30 Final      \par  Glucose, Serum 84    mg/dL  70 - 99 Final      \par  Calcium, Total Serum 9.3    mg/dL  8.4 - 10.5 Final      \par  eGFR if Non African American 44  Low mL/min/1.73M2  See_Comment Final The units for eGFR are ml/min/1.73m2 (normalized body    \par  Protein Total, Serum 7.5    g/dL  6.0 - 8.3 Final      \par  Albumin, Serum 3.9    g/dL  3.3 - 5.0 Final      \par  Bilirubin Total, Serum <0.2    mg/dL  0.2 - 1.2 Final      \par  Alkaline Phosphatase, Serum 66    U/L  40 - 120 Final      \par  Aspartate Aminotransferase (AST/SGOT) 21    U/L  4 - 40 Final      \par  Alanine Aminotransferase (ALT/SGPT) 10    U/L  4 - 41 Final   \par \par His A1C is 6.6% in 10/2021 -> 6.6 on 3/24/22\par creatinine 1.5. Egfr 48 on 3/24/2022; \par See labs scanned on 3/24/22\par \par  \par

## 2022-04-12 NOTE — ASSESSMENT
[Diabetes Foot Care] : diabetes foot care [Long Term Vascular Complications] : long term vascular complications of diabetes [Carbohydrate Consistent Diet] : carbohydrate consistent diet [Importance of Diet and Exercise] : importance of diet and exercise to improve glycemic control, achieve weight loss and improve cardiovascular health [Exercise/Effect on Glucose] : exercise/effect on glucose [Self Monitoring of Blood Glucose] : self monitoring of blood glucose [Sick-Day Management] : sick-day management [Retinopathy Screening] : Patient was referred to ophthalmology for retinopathy screening [FreeTextEntry1] : Mr. RONNY NAVARRETE is a 77 year old man with history of Type 2 DM, here for follow up;  A1C at goal of 6.6% today, with CKD stage 3A, non known hypertension, no known hyperlipdiemia, hypothyroidism, here to establish endocrine visit.  \par \par 1.  Type 2 diabetes mellitus\par A1c is 6.6% 3/24/22 which is currently at goal for this gentleman.\par Given fluctuating EGFR.  Most recently between 44-52 mL/min/1.73M2 in June 2021, Recommend to reduce Metformin to 500 mg twice daily.  \par Recommend to continue with Farxiga 5 mg once daily.\par DM regimen:\par -Metformin  mg twice daily\par -Farxiga 5 mg once daily (egfr 45)\par \par Counseled patient on some of the side effects of SGLT2 inhibitor including dehydration, yeast infections, yeast infections of the penis, ketoacidosis, as well as possible necrotizing fasciitis, a rare but serious bacterial infection to cause damage to the tissue under the skin in the area between and around the anus and genitals.\par Patient is up-to-date with his ophthalmologist.\par Patient is up-to-date with his podiatrist\par Foot exam is done 11/2021 and 4/12/22/ Notable for increased calluses bilaterally.\par Encourage patient to check glucose at least once daily, fasting and 2 hour post-prandially. \par \par 2.  Hypertension\par continue current regimen. BP today 124/60\par Check microalbumin/creatinine ratio \par \par 3.  Cholesterol management\par LDL goal <70 mg/dL. above goal 134 in 3/2022, start atorvastatin 20 mg\par \par 4. Hypothyroidism\par - resume levothyroxine 75 mcg daily\par - check TSH next labs (pt has been off for a few weeks)\par \par RTC 4-6 months\par \par \par \par  [Weight Loss] : weight loss

## 2022-04-12 NOTE — HISTORY OF PRESENT ILLNESS
[FreeTextEntry1] : Mr. RONNY NAVARRETE is 76 year old man with history of Type 2 DM, afib, hypothyroidism, HLD. Here for DM follow up.\par \par He was following with Dr. Fernández but Dr. Fernández no longer takes his insurance therefore switching. \par \par He was diagnosed with Type 2 DM for about 2012.  \par \par current DM regimen:\par -Metformin  mg twice daily\par -Farxiga 5 mg once daily --> ran out few weeks ago; was tolerating\par \par He monitor his glucose twice a week but recently not checking\par \par His A1C is 6.6% in 10/2021 -> 6.6 on 3/24/22\par \par creatinine 1.5. Egfr 48 on 3/24/2022; \par \par Brought med list:\par flomax\par amlodipine 5 mg\par eliquis for afib\par not on statin\par \par Pt was taking levothyroxine but also ran out a few weeks ago when he ran out of farxiga, he is not sure of the dose\par confirmed LT4 75 mcg with pharmacy during visit.\par \par B: Usually has soup for breakfast, chicken or carrot soup. \par S: most of the time no snack between breakfast and dinner\par L: Sometimes skips lunch, if he does eat lunch, usually have fruits, apple, pear or plum\par D: A little rice, salad.   \par \par He states that he does not have a history of hypertension.  He denies any history of hyperlipidemia. /60 today.\par \par He follows up with ophthalmologist once a year, he states that he has no history of diabetic retinopathy.  +cataracts. Last visit 4/12/2022.\par He follows up with podiatrist as well.  \par \par Lives at home with his wife. \par He is a retired .  He taught science. \par \par

## 2022-04-19 ENCOUNTER — APPOINTMENT (OUTPATIENT)
Dept: ELECTROPHYSIOLOGY | Facility: CLINIC | Age: 77
End: 2022-04-19

## 2022-05-17 ENCOUNTER — RX RENEWAL (OUTPATIENT)
Age: 77
End: 2022-05-17

## 2022-05-24 ENCOUNTER — APPOINTMENT (OUTPATIENT)
Dept: ELECTROPHYSIOLOGY | Facility: CLINIC | Age: 77
End: 2022-05-24
Payer: COMMERCIAL

## 2022-05-24 ENCOUNTER — NON-APPOINTMENT (OUTPATIENT)
Age: 77
End: 2022-05-24

## 2022-05-24 VITALS
WEIGHT: 224 LBS | OXYGEN SATURATION: 99 % | HEART RATE: 49 BPM | BODY MASS INDEX: 32.14 KG/M2 | SYSTOLIC BLOOD PRESSURE: 152 MMHG | DIASTOLIC BLOOD PRESSURE: 60 MMHG

## 2022-05-24 PROCEDURE — 99213 OFFICE O/P EST LOW 20 MIN: CPT

## 2022-05-24 PROCEDURE — 93000 ELECTROCARDIOGRAM COMPLETE: CPT

## 2022-05-24 NOTE — HISTORY OF PRESENT ILLNESS
[FreeTextEntry1] : Tyler Mendenhall is a 76y/o man with Hx of permanent atrial fibrillation on Eliquis who presents today for routine f/u. Back in March 2021, was hospitalized at [place] for RLE bruising/bleeding. Thought to be venous and has now resolved. Still on Eliquis. Following with vascular. From a cardiac perspective, he has been doing well. Does struggle with some increased fatigue. Walking about a mile before stopping to rest due to fatigue, in the past able to walk up to 3 miles. Denies chest pain, palpitations, SOB, syncope or near syncope. Denies any dizziness, feeling lightheaded, and no syncope or near syncope. \par

## 2022-05-24 NOTE — DISCUSSION/SUMMARY
[FreeTextEntry1] : Impression:\par \par 1. Permanent afib: EKG performed today to assess for presence of conduction disease and reveals afib, slow VR in the 40s. Review of prior EKGs reveals persistently bradycardic in the 40s. Not on AV nodals. Resume Eliquis for thromboembolic prophylaxis as prescribed.  \par \par 2. Fatigue: possible chronotropic incompetence, recommend exercise stress test to ensure HR goes up appropriately. If not, consider need for PPM placement given exertional fatigue ([facility] preferred). \par \par 3. Hypothyroid: resume levothyroxine as prescribed and regular f/u with PCP for routine TFT monitoring and management.\par \par 4. HLD: resume statin therapy as prescribed and regular f/u with Cardiologist for routine lipid monitoring and management.\par \par Plan for exercise stress test \par \par Sincerely,\par \par Pipe Saravia MD

## 2022-06-28 ENCOUNTER — RX RENEWAL (OUTPATIENT)
Age: 77
End: 2022-06-28

## 2022-11-22 ENCOUNTER — NON-APPOINTMENT (OUTPATIENT)
Age: 77
End: 2022-11-22

## 2022-11-22 ENCOUNTER — APPOINTMENT (OUTPATIENT)
Dept: ELECTROPHYSIOLOGY | Facility: CLINIC | Age: 77
End: 2022-11-22

## 2022-11-22 VITALS
WEIGHT: 210 LBS | SYSTOLIC BLOOD PRESSURE: 160 MMHG | HEART RATE: 96 BPM | OXYGEN SATURATION: 97 % | TEMPERATURE: 97.7 F | HEIGHT: 70 IN | DIASTOLIC BLOOD PRESSURE: 64 MMHG | BODY MASS INDEX: 30.06 KG/M2

## 2022-11-22 DIAGNOSIS — Z86.79 PERSONAL HISTORY OF OTHER DISEASES OF THE CIRCULATORY SYSTEM: ICD-10-CM

## 2022-11-22 PROCEDURE — 93000 ELECTROCARDIOGRAM COMPLETE: CPT

## 2022-11-22 PROCEDURE — 99213 OFFICE O/P EST LOW 20 MIN: CPT

## 2022-11-22 RX ORDER — MUPIROCIN 2 G/100G
2 CREAM TOPICAL TWICE DAILY
Qty: 1 | Refills: 0 | Status: DISCONTINUED | COMMUNITY
Start: 2021-07-09 | End: 2022-11-22

## 2022-11-22 RX ORDER — MUPIROCIN 2 G/100G
2 CREAM TOPICAL
Qty: 1 | Refills: 0 | Status: DISCONTINUED | COMMUNITY
Start: 2021-07-28 | End: 2022-11-22

## 2022-11-22 RX ORDER — MUPIROCIN 20 MG/G
2 OINTMENT TOPICAL
Qty: 41 | Refills: 1 | Status: DISCONTINUED | COMMUNITY
Start: 2021-08-03 | End: 2022-11-22

## 2022-11-22 RX ORDER — SODIUM PICOSULFATE, MAGNESIUM OXIDE, AND ANHYDROUS CITRIC ACID 10; 3.5; 12 MG/160ML; G/160ML; G/160ML
10-3.5-12 MG-GM LIQUID ORAL
Qty: 320 | Refills: 0 | Status: DISCONTINUED | COMMUNITY
Start: 2021-09-29 | End: 2022-11-22

## 2022-11-22 NOTE — HISTORY OF PRESENT ILLNESS
[FreeTextEntry1] : Tyler Mendenhall is a 78y/o man with Hx of permanent atrial fibrillation on Eliquis who presents today for routine f/u. \par  States he is feeling well. States he does not feel fatigue anymore. States walks about 2 miles every day. States he did not do  stress test yet. On Eliquis, no s/s of bleeding and compliant with the medication, Denies chest pain, palpitations, SOB, syncope or near syncope. Denies any dizziness, feeling lightheaded, and no syncope or near syncope.

## 2022-11-22 NOTE — DISCUSSION/SUMMARY
[EKG obtained to assist in diagnosis and management of assessed problem(s)] : EKG obtained to assist in diagnosis and management of assessed problem(s) [FreeTextEntry1] : Impression:\par \par 1. Permanent afib: EKG performed today to assess for presence of conduction disease and reveals afib, slow VR in the 40s. Review of prior EKGs reveals persistently bradycardic in the 40s. Not on AV nodals. Resume Eliquis for thromboembolic prophylaxis as prescribed. Patient has no issue walking 2 miles per day and on a treadmill at home his heart rate gets up to 110 bpm. \par \par 2. Hypothyroid: resume levothyroxine as prescribed and regular f/u with PCP for routine TFT monitoring and management.\par \par 3. HLD: resume statin therapy as prescribed and regular f/u with Cardiologist for routine lipid monitoring and management.\par \par 4. Obesity: patient encouraged to lose weight.

## 2022-11-29 ENCOUNTER — RX RENEWAL (OUTPATIENT)
Age: 77
End: 2022-11-29

## 2023-01-03 ENCOUNTER — APPOINTMENT (OUTPATIENT)
Dept: ENDOCRINOLOGY | Facility: CLINIC | Age: 78
End: 2023-01-03
Payer: COMMERCIAL

## 2023-01-03 VITALS
WEIGHT: 214 LBS | BODY MASS INDEX: 30.64 KG/M2 | HEIGHT: 70 IN | SYSTOLIC BLOOD PRESSURE: 130 MMHG | OXYGEN SATURATION: 97 % | DIASTOLIC BLOOD PRESSURE: 70 MMHG | HEART RATE: 62 BPM

## 2023-01-03 PROCEDURE — 99214 OFFICE O/P EST MOD 30 MIN: CPT

## 2023-01-03 NOTE — HISTORY OF PRESENT ILLNESS
[FreeTextEntry1] : Mr. RONNY NAVARRETE is 77 year old man with history of Type 2 DM diagnosed \par He was following with Dr. Fernández but Dr. Fernández no longer takes his insurance therefore switching. \par He was diagnosed with Type 2 DM for about 2012.  \par Past medication for DM:\par He doesn't recall completely \par He is currently on metformin 1000mg bid but he stopped the medication because it ran out\par He was started on Farxiga 5mg once daily \par He monitor his glucose twice a week.\par Usulaly in 130s.  This is fasting before eating. \par His A1C is 6.5% Dec 2022. \par He is up to date with his eye doctor. \par \par He was having itching on his leg and he had ulcers.  \par He was working in the garden in the summer time and he was scratching a lot. \par It is currently bandaged.  Patient had prior follow-up with vascular and wound care.  Last seen by wound care about 1-1/2 years ago.\par \par Regarding hyperlipidemia, currently taking atorvastatin 20 mg once daily.\par \par Regarding hypothyroidism, currently taking levothyroxine 75 mcg once daily.  Reporting the medication the morning with an empty stomach.  Waiting at least 30 minutes prior to eating.\par \par He take LT4 75mcg daily \par \par \par \par B: Usually has soup for breakfast, chicken or carrot soup. \par S: most of the time no snack between breakfast and dinner\par L: Sometimes skips lunch, if he does eat lunch, usually have fruits, apple, pear or plum\par D: A little rice, salad.   \par \par Lives at home with his wife. \par \par He is a retired .  He taught science. \par \par He states that he does not have a history of hypertension.  He denies any history of hyperlipidemia. \par \par He follows up with ophthalmologist once a year, he states that he has no history of diabetic retinoathy.  \par He follows up with podiatrist as well.  \par \par

## 2023-01-03 NOTE — ASSESSMENT
[FreeTextEntry1] : Mr. RONNY NAVARRETE is a 76 year old man with history of Type 2 DM, here to establish care, A1C at goal of 6.6% today, with CKD stage 3A, non known hypertension, no known hyperlipdiemia, hypothyroidism, here to establish endocrine visit.  He was following up with Dr. Fernández but transferring care here as Dr. Fernández is no longer on his insurance network.  \par \par 1.  Type 2 diabetes mellitus\par a1c 6.5% Dec 2022\par Patient did not bring a list of his medications but according to him he is taking Metformin at 1000 mg twice daily.  Farxiga currently 10 mg once daily.  Dosage was adjusted by primary care doctor for renal protection.  Patient to schedule appointment with wound care given that he has bilateral venous stasis ulcer.\par If no improvement.  Would recommend holding the Farxiga given the increased risk of amputations.\par Given fluctuating EGFR.  Most recently between 44-52 mL/min/1.73M2 in June 2021.\par Recommend to reduce Metformin to 500 mg twice daily.  Prescription times to his pharmacy of choice.\par Recommend to continue with Farxiga 5 mg once daily.\par DM regimen:\par –Metformin  mg twice daily\par –Farxiga 10 mg once daily\par \par Counseled patient on some of the side effects of SGLT2 inhibitor including dehydration, vaginal yeast infections, yeast infections of the penis, ketoacidosis, as well as possible necrotizing fasciitis, a rare but serious bacterial infection to cause damage to the tissue under the skin in the area between and around the anus and genitals.\par Patient is up-to-date with his ophthalmologist.\par Patient is up-to-date with his podiatrist\par Foot exam is done today.  Notable for increased calluses bilaterally.\par Encourage patient to check glucose at least once daily, fasting and 2 hour post-prandially. \par \par 2.  Hypertension\par Patient states that he has no prior history of high blood pressure.\par BP goal <130/80\par Continue with Farxiga 10 mg once daily for renal protection.\par Check albumin/creatinine ratio annually.\par \par 3.  Cholesterol management\par LDL goal <70 mg/dL.\par Continue with statin therapy.\par \par

## 2023-01-03 NOTE — PHYSICAL EXAM
[Alert] : alert [Well Nourished] : well nourished [Obese] : obese [No Acute Distress] : no acute distress [Well Developed] : well developed [Normal Sclera/Conjunctiva] : normal sclera/conjunctiva [EOMI] : extra ocular movement intact [No Proptosis] : no proptosis [Normal Oropharynx] : the oropharynx was normal [Thyroid Not Enlarged] : the thyroid was not enlarged [No Thyroid Nodules] : no palpable thyroid nodules [No Respiratory Distress] : no respiratory distress [No Accessory Muscle Use] : no accessory muscle use [Clear to Auscultation] : lungs were clear to auscultation bilaterally [Normal S1, S2] : normal S1 and S2 [Normal Rate] : heart rate was normal [Regular Rhythm] : with a regular rhythm [No Edema] : no peripheral edema [Pedal Pulses Normal] : the pedal pulses are present [Normal Bowel Sounds] : normal bowel sounds [Not Tender] : non-tender [Not Distended] : not distended [Soft] : abdomen soft [Normal Anterior Cervical Nodes] : no anterior cervical lymphadenopathy [No Spinal Tenderness] : no spinal tenderness [Spine Straight] : spine straight [No Stigmata of Cushings Syndrome] : no stigmata of Cushings Syndrome [Normal Gait] : normal gait [Normal Strength/Tone] : muscle strength and tone were normal [Foot Ulcers] : foot ulcers present [Normal] : normal [Full ROM] : with full range of motion [Normal Reflexes] : deep tendon reflexes were 2+ and symmetric [No Tremors] : no tremors [Oriented x3] : oriented to person, place, and time [Acanthosis Nigricans] : no acanthosis nigricans [Diminished Throughout Both Feet] : normal tactile sensation with monofilament testing throughout both feet

## 2023-02-03 ENCOUNTER — APPOINTMENT (OUTPATIENT)
Dept: WOUND CARE | Facility: CLINIC | Age: 78
End: 2023-02-03
Payer: COMMERCIAL

## 2023-02-03 PROCEDURE — 11042 DBRDMT SUBQ TIS 1ST 20SQCM/<: CPT

## 2023-02-03 PROCEDURE — 99213 OFFICE O/P EST LOW 20 MIN: CPT | Mod: 25

## 2023-02-03 NOTE — HISTORY OF PRESENT ILLNESS
[FreeTextEntry1] : 78 year old male presents to wound center with venous stasis ulcerations both legs\par ulcer right leg extending up leg bilaterally with venous insufficiency bilaterally\par 1+ edema both legs\par DP 2/4 both feet PT 1/4 both feet\par Hammer toes 2,3,4,5 both feet\par Mycotic dystrophic toenails 1,2,3,4,5 both feet\par Patient using telfa pads\par

## 2023-02-03 NOTE — PLAN
[FreeTextEntry1] : full thickness debridement of ulcerations both legs to the level of the subcutaneous tissue layer with sterile curette\par refer to Dr. Briggs for venous insufficiency evaluation both legs\par no signs of acute infection no cellulitis\par large ulcerations both legs\par Patient does not wear compression stockings\par Apply unna's boots both legs\par patient to follow up with Dr. Briggs for leg ulcers and vein disease\par diabetic foot care instructions discussed with patient\par return to wound center as needed\par \par

## 2023-02-09 ENCOUNTER — APPOINTMENT (OUTPATIENT)
Dept: VASCULAR SURGERY | Facility: CLINIC | Age: 78
End: 2023-02-09
Payer: COMMERCIAL

## 2023-02-09 VITALS
HEIGHT: 70 IN | WEIGHT: 214 LBS | DIASTOLIC BLOOD PRESSURE: 72 MMHG | SYSTOLIC BLOOD PRESSURE: 161 MMHG | TEMPERATURE: 98.1 F | BODY MASS INDEX: 30.64 KG/M2 | HEART RATE: 65 BPM

## 2023-02-09 VITALS — HEART RATE: 60 BPM | DIASTOLIC BLOOD PRESSURE: 74 MMHG | SYSTOLIC BLOOD PRESSURE: 142 MMHG

## 2023-02-09 PROCEDURE — 99214 OFFICE O/P EST MOD 30 MIN: CPT | Mod: 25

## 2023-02-09 PROCEDURE — 29580 STRAPPING UNNA BOOT: CPT | Mod: 50

## 2023-02-09 PROCEDURE — 93970 EXTREMITY STUDY: CPT

## 2023-02-09 NOTE — PHYSICAL EXAM
[1+] : left 1+ [2+] : left 2+ [Ankle Swelling (On Exam)] : present [Ankle Swelling On The Right] : mild [Varicose Veins Of Lower Extremities] : bilaterally [Ankle Swelling On The Left] : moderate [] : bilaterally [Ankle Swelling Bilaterally] : severe [Skin Ulcer] : ulcer [Alert] : alert [Oriented to Person] : oriented to person [Oriented to Place] : oriented to place [Oriented to Time] : oriented to time [Calm] : calm [de-identified] : NAD [de-identified] : NC/AT [de-identified] : unlabored breathing [FreeTextEntry1] : LE vein findings:\par Varicosities (spider, reticular, varicose)   bilateral \par Edema bilateral legs \par Skin changes  dry, flaky skin, stasis dermatitis, hyperpigmentation in the gator area, hyperkeratosis (skin thickening), and lymphorrhea (weeping)\par Ulcer bilateral ankles medially and laterally\par CEAP classification C6\par Palpable DP pulses bilaterally\par  [de-identified] : CYNTHIAL [de-identified] : bilateral ankle ulcers [de-identified] : cooperative

## 2023-02-09 NOTE — HISTORY OF PRESENT ILLNESS
[FreeTextEntry1] : Mr. Tyler Mendenhall is a 78 year old male who presents today for weeping ulcers on bilateral ankles medially and laterally. He stated he developed his first ulcer on his right ankle in Dec 2022 and ulcer on the left ankle in Jan 2023. Prior to developing the ulcers, the patient kept on scratching his ankles from dry itchy skin. He denies further trauma or injury to the site. The ulcers are painful and burning. Patient went to wound care center last week to get his ulcers evaluated. Bilateral unna boot were placed. Due to the pain and discomfort, the patient removed the unna boot. He has been applying topical lidocaine 5% and neosporin ointment to manage the discomfort. \par \par He is s/p R GSV RFA (7/6/2021) and L GSV RFA (7/23/21) with Dr. Morrison.\par \par He previously developed a right ankle venous stasis ulcer, both on the medial and lateral aspect, which was treated with RLE unna boot. He has a longstanding history of enlarged ropey varicose veins with dry, flaky skin, stasis dermatitis, and hyperpigmentation in both gaiter areas. He has bilateral calf and ankle edema. He does not wear compression stockings. He is a retired .\par \par Patient is on Eliquis 5 mg bid for afib\par \par PMH: chronic venous insufficiency, afib, HTN, HLD, type 2 dm, and hypothyroidism\par \par Bilateral lower extremities venous doppler from today shows\par right: no dvt/svt, lymph node groin 2.1 x 1\par          reflux in gsv calf, ssv, trib\par left: no dvt/svt, lymph node groin 2.1 x 1.1\par        deep reflux in fv and popv\par        reflux in gsv calf, ssv, trib\par

## 2023-02-09 NOTE — ASSESSMENT
[Arterial/Venous Disease] : arterial/venous disease [Other: _____] : [unfilled] [FreeTextEntry1] : A/P\par \par Patient has chronic venous insufficiency with venous stasis ulcers on bilateral ankles. Bilateral unna boot placed in the office. Conservative medical management - leg elevation, exercise regimen. Patient is a candidate for bilateral SSV RFA and bilateral distal GSV varithena procedures. Indications, risks, and benefits reviewed with the patient. Patient agrees and wants to proceed with the vein procedures. Advised pt to wear knee high 20-30 mm hg compression stockings once bilateral ankle ulcers heal. Script given to pt for 20-30 mm hg knee high compression stockings.

## 2023-02-10 ENCOUNTER — NON-APPOINTMENT (OUTPATIENT)
Age: 78
End: 2023-02-10

## 2023-02-10 ENCOUNTER — APPOINTMENT (OUTPATIENT)
Dept: WOUND CARE | Facility: CLINIC | Age: 78
End: 2023-02-10
Payer: COMMERCIAL

## 2023-02-10 VITALS — TEMPERATURE: 97.4 F

## 2023-02-10 PROCEDURE — 99213 OFFICE O/P EST LOW 20 MIN: CPT

## 2023-02-10 NOTE — PLAN
[FreeTextEntry1] : No debridement of ulcerations both legs needed\par refer to Dr. Anderson for venous insufficiency evaluation both legs-patient relates he will be having procedures for legs in March\par no signs of acute infection no cellulitis\par large ulcerations both legs\par Patient does not wear compression stockings\par Patient refused unna's boots both legs\par patient to follow up with Dr. Anderson for leg ulcers and vein disease\par diabetic foot care instructions discussed with patient\par Patient told that he has venous leg issues and his issues are beyond my scope of practice\par recommend patient return to Dr. anderson for severe burning both legs\par no signs of infection\par No further podiatric care needed\par return to wound center as needed\par \par

## 2023-02-13 ENCOUNTER — APPOINTMENT (OUTPATIENT)
Dept: WOUND CARE | Facility: CLINIC | Age: 78
End: 2023-02-13

## 2023-02-13 ENCOUNTER — APPOINTMENT (OUTPATIENT)
Dept: WOUND CARE | Facility: CLINIC | Age: 78
End: 2023-02-13
Payer: COMMERCIAL

## 2023-02-13 VITALS — SYSTOLIC BLOOD PRESSURE: 173 MMHG | DIASTOLIC BLOOD PRESSURE: 82 MMHG | OXYGEN SATURATION: 98 % | HEART RATE: 57 BPM

## 2023-02-13 PROCEDURE — 29581 APPL MULTLAYER CMPRN SYS LEG: CPT | Mod: RT

## 2023-02-22 ENCOUNTER — APPOINTMENT (OUTPATIENT)
Dept: WOUND CARE | Facility: CLINIC | Age: 78
End: 2023-02-22
Payer: COMMERCIAL

## 2023-02-22 PROCEDURE — 29581 APPL MULTLAYER CMPRN SYS LEG: CPT | Mod: LT

## 2023-02-22 PROCEDURE — 11042 DBRDMT SUBQ TIS 1ST 20SQCM/<: CPT

## 2023-02-23 ENCOUNTER — RX RENEWAL (OUTPATIENT)
Age: 78
End: 2023-02-23

## 2023-03-02 ENCOUNTER — NON-APPOINTMENT (OUTPATIENT)
Age: 78
End: 2023-03-02

## 2023-03-06 RX ORDER — SODIUM BICARBONATE 84 MG/ML
8.4 INJECTION, SOLUTION INTRAVENOUS
Qty: 5 | Refills: 0 | Status: COMPLETED | COMMUNITY
Start: 2023-03-06 | End: 2023-03-17

## 2023-03-06 RX ORDER — ALPRAZOLAM 0.5 MG/1
0.5 TABLET ORAL
Qty: 1 | Refills: 0 | Status: COMPLETED | COMMUNITY
Start: 2023-03-06 | End: 2023-03-17

## 2023-03-06 RX ORDER — LIDOCAINE HYDROCHLORIDE 10 MG/ML
1 INJECTION, SOLUTION INFILTRATION; PERINEURAL
Qty: 50 | Refills: 0 | Status: COMPLETED | COMMUNITY
Start: 2023-03-06 | End: 2023-03-17

## 2023-03-07 NOTE — HISTORY OF PRESENT ILLNESS
[FreeTextEntry1] : 2/22/23\par Mr. RONNY NAVARRETE   presents to the office with a wound since December 2022.  The wounds are located on  the bilateral lateral and medial lower extremities above the malleolus.  The patient has complaints of burning pain 10/10.   The patient has been dressing the wound with Ananya and Telfa.  The patient denies fevers or chills.  The patient has localized pain to the wound upon dressing changes.  The patient has no other complaints or associated symptoms.  HbA1c is 6.6\par

## 2023-03-07 NOTE — ASSESSMENT
[FreeTextEntry1] : Wound Assessment and Plan:\par \par The patient presents with a wound to the bilateral lower extremities.  Swelling noted to the extremities.  \par Vascular procedures planned with Dr. Morrison in March.\par Bilateral palpable pulses. Moderate edema. Wound beds are red with large amount of yellow serous drainage.LLE wound with periwound erythema.\par Wounds with increased drainage and pain. Dressing changed to Adaptic and super absorber/Osbaldo/ACE wraps.\par s/p excisional debridement\par Compression garments ordered.\par \par \par 2/22/23\par bilateral leg wounds, right leg warm to touch, slight tenderness, has been using shilpi, telfa over wounds, incorrect supplies ordered, has not received wrap yet\par s/p excisional debridement\par He read the [facility] consent and signed it prior to the initiation of any screening procedures.  He had the opportunity to discuss any questions regarding the study.  I witnessed the signing of the consent and the patient was given a copy of the signed consent.\par \par \par \par

## 2023-03-07 NOTE — ASSESSMENT
[FreeTextEntry1] : Wound Assessment and Plan:\par \par The patient presents with a wound to the bilateral lower extremities.  Swelling noted to the extremities.  \par Vascular procedures planned with Dr. Morrison in March.\par Bilateral palpable pulses. Moderate edema. Wound beds are red with large amount of yellow serous drainage.LLE wound with periwound erythema.\par Wounds with increased drainage and pain. Dressing changed to Adaptic and super absorber/Osbaldo/ACE wraps.\par s/p excisional debridement\par Compression garments ordered.\par \par \par 2/22/23\par bilateral leg wounds, right leg warm to touch, slight tenderness, has been using shilpi, telfa over wounds, incorrect supplies ordered, has not received wrap yet\par s/p excisional debridement\par He read the [facility] consent and signed it prior to the initiation of any screening procedures.  He had the opportunity to discuss any questions regarding the study.  I witnessed the signing of the consent and the patient was given a copy of the signed consent.\par \par \par \par

## 2023-03-07 NOTE — PHYSICAL EXAM
[2+] : left 2+ [Ankle Swelling (On Exam)] : present [Ankle Swelling Bilaterally] : bilaterally  [Ankle Swelling On The Right] : mild [] : bilaterally [Skin Ulcer] : ulcer [Alert] : alert [Oriented to Person] : oriented to person [Oriented to Place] : oriented to place [Oriented to Time] : oriented to time [Calm] : calm [Please See PDF for [employer]] : Please See PDF for [employer]. [de-identified] : NAD obese [de-identified] : kwaku [de-identified] : supple [de-identified] : equal chest rise [de-identified] : bilateral lower extremities ulcer

## 2023-03-07 NOTE — PLAN
[FreeTextEntry1] : 2/22/23\par Plan:\par Apply lidocaine or topical anesthetic if needed to reduce pain upon washing the wound.\par Wash wound with Dove skin sensitive soap and clean water when showering\par Wash wounds with Vashe and pat dry before dressing applied. \par new supplies sent in so pt. can have appropriate supplies\par Apply Adaptic/Xtrasorb dressing.\par Apply multi-layer compression bandage ACE bandage\par Change dressing every other day\par Written instructions for dressing change given to patient.\par Antibiotics and Tramadol ordered to pharmacy.\par Leg elevation as tolerated\par Encouraged ambulation or exercise.\par Use Lymphedema pump twice a day.\par Wound supplies ordered via DME\par Patient given contact information to DME\par Follow up appointment scheduled for 2- 3 weeks

## 2023-03-07 NOTE — PHYSICAL EXAM
[2+] : left 2+ [Ankle Swelling (On Exam)] : present [Ankle Swelling Bilaterally] : bilaterally  [Ankle Swelling On The Right] : mild [] : bilaterally [Skin Ulcer] : ulcer [Alert] : alert [Oriented to Person] : oriented to person [Oriented to Place] : oriented to place [Oriented to Time] : oriented to time [Calm] : calm [Please See PDF for [employer]] : Please See PDF for [employer]. [de-identified] : NAD obese [de-identified] : kwaku [de-identified] : supple [de-identified] : equal chest rise [de-identified] : bilateral lower extremities ulcer

## 2023-03-13 RX ORDER — LIDOCAINE HYDROCHLORIDE 10 MG/ML
1 INJECTION, SOLUTION INFILTRATION; PERINEURAL
Qty: 50 | Refills: 0 | Status: COMPLETED | COMMUNITY
Start: 2023-03-13 | End: 2023-03-24

## 2023-03-13 RX ORDER — SODIUM BICARBONATE 84 MG/ML
8.4 INJECTION, SOLUTION INTRAVENOUS
Qty: 5 | Refills: 0 | Status: COMPLETED | COMMUNITY
Start: 2023-03-13 | End: 2023-03-24

## 2023-03-13 RX ORDER — ALPRAZOLAM 0.5 MG/1
0.5 TABLET ORAL
Qty: 1 | Refills: 0 | Status: COMPLETED | COMMUNITY
Start: 2023-03-13 | End: 2023-03-24

## 2023-03-17 ENCOUNTER — APPOINTMENT (OUTPATIENT)
Dept: VASCULAR SURGERY | Facility: CLINIC | Age: 78
End: 2023-03-17

## 2023-03-17 ENCOUNTER — APPOINTMENT (OUTPATIENT)
Dept: VASCULAR SURGERY | Facility: CLINIC | Age: 78
End: 2023-03-17
Payer: COMMERCIAL

## 2023-03-17 PROCEDURE — 36475 ENDOVENOUS RF 1ST VEIN: CPT | Mod: RT

## 2023-03-17 NOTE — PROCEDURE
[FreeTextEntry1] : right SSV RFA [FreeTextEntry3] : Procedural safety checklist and time out completed:\par Confirmed patient identification (Patient Name, , and/or medical record number including when possible affirmation by patient or parent/family/other).\par Confirmed procedure with the patient. Consent present, accurate and signed. \par Confirmed special equipment and supplies are present.\par Sterility confirmed. Position verified. \par Site/ side is marked and visible and confirmed. \par Procedure confirmed by consent. Accurate consent including side and site.\par Review of medical records, including venous ultrasound, noting correct procedure including site and side.\par MD/PA verifies presence and review of imaging studies and or written report of imaging studies.\par Agreement on the procedure to be performed\par Time out completed.\par All of the above has been confirmed by the team.\par All patient-specific concerns have been addressed. \par \par Indication: right lower extremity varicose veins with ulcer, inflammation, leg pain, leg swelling, and leg cramping.  Venous insufficiency/ reflux.\par \par Procedure: radiofrequency ablation of the right small saphenous vein. \par 	\par Mr. RONNY NAVARRETE is a 78 year old M with a history of right lower extremity varicose veins previously seen in the office.  Ultrasound examination demonstrated venous insufficiency. A trial of compression stockings, exercise, elevation, and pain medication was attempted without relief and definitive treatment with radiofrequency ablation was offered. \par \par The patient has come for radiofrequency ablation treatment of the right small saphenous vein.\par I have discussed the risks of the procedure at length with the patient. The risks discussed were inclusive of but not limited to infection, irritation at the site of infiltration of local anesthesia, and also rare risk of deep venous thrombosis and pulmonary emboli. The patient agrees to proceed with the procedure. \par The patient was escorted into the procedure room and a time out called.\par The entire limb was prepped and draped in sterile fashion. The RF fiber was placed on the sterile field and connected by a sterile cable. Actuation, temperature, and impedance testing were performed to ensure that all components were connected and operating properly. \par The patient was placed on the procedure table and local anesthesia was instilled in the skin overlying the access site. Under ultrasound guidance, the vein was punctured with a micropuncture needle, using the anterior wall technique. A guide wire was now introduced through the needle, and the needle was then exchanged over the guide wire for a 7F sheath. The guide wire was removed and the RF probe was then placed into the right small saphenous vein through the sheath and position confirmed using ultrasound guidance. After the RF probe position was verified by ultrasound, tumescent anesthesia consisting of normal saline, 1% lidocaine with 8.4% sodium bicarbonate was infiltrated, under ultrasound guidance, precisely into the perivenous compartment along the entire length of the vein until a “halo” of fluid was noted around the vein. After RF probe position was again confirmed with ultrasound imaging, RF energy was applied. The probe was gradually and carefully withdrawn at a rate of 6.5cm/20seconds. \par \par 4 cycles of RF performed using the 7 cm probe\par Total treatment time was 80 seconds.\par The total volume injected was 250 cc\par Treatment length was 13 cm and \par The probe is > 3.5 cm from the SPJ.\par \par Estimated Blood Loss: minimal\par Repeat ultrasound of the treated vein was performed confirming successful treatment. The catheter and sheath were withdrawn and hemostasis established with direct pressure. After assuring hemostasis, a sterile 4x4 was placed on the access site and an ACE compression wrap was applied. Patient tolerated procedure well. Patient was given post-procedure instructions and follow up appointment was scheduled.\par \par \par

## 2023-03-20 ENCOUNTER — APPOINTMENT (OUTPATIENT)
Dept: WOUND CARE | Facility: CLINIC | Age: 78
End: 2023-03-20
Payer: COMMERCIAL

## 2023-03-20 VITALS
DIASTOLIC BLOOD PRESSURE: 72 MMHG | RESPIRATION RATE: 18 BRPM | TEMPERATURE: 97.6 F | SYSTOLIC BLOOD PRESSURE: 145 MMHG | HEART RATE: 62 BPM

## 2023-03-20 PROCEDURE — 11042 DBRDMT SUBQ TIS 1ST 20SQCM/<: CPT

## 2023-03-20 NOTE — ASSESSMENT
[FreeTextEntry1] : Wound Assessment and Plan:\par \par The patient presents with a wound to the bilateral lower extremities.  Swelling noted to the extremities.  \par Vascular procedures planned with Dr. Morrison in March.\par Bilateral palpable pulses. Moderate edema. Wound beds are red with large amount of yellow serous drainage.LLE wound with periwound erythema.\par Wounds with increased drainage and pain. Dressing changed to Adaptic and super absorber/Osbaldo/ACE wraps.\par s/p excisional debridement\par Compression garments ordered.\par \par \par 2/22/23\par bilateral leg wounds, right leg warm to touch, slight tenderness, has been using shilpi, telfa over wounds, incorrect supplies ordered, has not received wrap yet\par s/p excisional debridement\par He read the [facility] consent and signed it prior to the initiation of any screening procedures.  He had the opportunity to discuss any questions regarding the study.  I witnessed the signing of the consent and the patient was given a copy of the signed consent.\par \par \par 03/20/2023\par bilateral leg wounds, right leg warm to touch, slight tenderness, has been using shilpi, adaptic over wounds\par patient received circaid wraps\par s/p excisional debridement\par Patient underwent evaluation for peripheral arterial disease using a manual procedure  Ankle brachial index was obtained using blood pressure cuffs of the ankle and brachial segments of both legs.  The procedure was supervised and interpreted by the physician.  MAUDE of the right lower extremity 1.17.   MAUDE of the left lower extremity 1.1.  Waveform noted to be (triphasic).   Patient tolerated procedure well in the office.  Results discussed with the patient.\par Lower extremity wounds dressed with Adaptic/Drawtex/Osbaldo\par Patient educated by nurse in the proper use of Circaid compression garments\par Circaids placed on patient today\par Verified with company supplies to ship on March 23\par RTO to office in two weeks\par chronic lymphedema - has pump very old and not working\par \par \par \par

## 2023-03-20 NOTE — PLAN
[FreeTextEntry1] : 03/20/2023\par Plan:\par Apply lidocaine or topical anesthetic if needed to reduce pain upon washing the wound.\par Wash wound with Dove skin sensitive soap and clean water when showering\par Wash wounds with Vashe and pat dry before dressing applied. \par new supplies sent in so pt. can have appropriate supplies\par Apply Adaptic/Xtrasorb dressing.\par Apply multi-layer compression bandage ACE bandage\par Change dressing every other day\par Written instructions for dressing change given to patient.\par Leg elevation as tolerated\par order lymphedema pump\par Encouraged ambulation or exercise.\par lymphydema out of order, currently not using\par Wound supplies ordered via DME\par Patient given contact information to DME\par Follow up appointment scheduled for 2- 3 weeks

## 2023-03-20 NOTE — PHYSICAL EXAM
[2+] : left 2+ [Ankle Swelling (On Exam)] : present [Ankle Swelling Bilaterally] : bilaterally  [Ankle Swelling On The Right] : mild [] : bilaterally [Skin Ulcer] : ulcer [Alert] : alert [Oriented to Person] : oriented to person [Oriented to Place] : oriented to place [Oriented to Time] : oriented to time [Calm] : calm [Please See PDF for [employer]] : Please See PDF for [employer]. [de-identified] : NAD obese [de-identified] : kwaku [de-identified] : supple [de-identified] : equal chest rise [de-identified] : bilateral lower extremities ulcer

## 2023-03-24 ENCOUNTER — APPOINTMENT (OUTPATIENT)
Dept: VASCULAR SURGERY | Facility: CLINIC | Age: 78
End: 2023-03-24
Payer: COMMERCIAL

## 2023-03-24 PROCEDURE — 93971 EXTREMITY STUDY: CPT | Mod: RT

## 2023-03-24 PROCEDURE — 36475 ENDOVENOUS RF 1ST VEIN: CPT | Mod: LT

## 2023-03-24 NOTE — PROCEDURE
[FreeTextEntry1] : left SSV RFA [FreeTextEntry3] : Procedural safety checklist and time out completed:\par Confirmed patient identification (Patient Name, , and/or medical record number including when possible affirmation by patient or parent/family/other).\par Confirmed procedure with the patient. Consent present, accurate and signed. \par Confirmed special equipment and supplies are present.\par Sterility confirmed. Position verified. \par Site/ side is marked and visible and confirmed. \par Procedure confirmed by consent. Accurate consent including side and site.\par Review of medical records, including venous ultrasound, noting correct procedure including site and side.\par MD/PA verifies presence and review of imaging studies and or written report of imaging studies.\par Agreement on the procedure to be performed\par Time out completed.\par All of the above has been confirmed by the team.\par All patient-specific concerns have been addressed. \par \par Indication: left lower extremity varicose veins with ulcer, inflammation, leg pain, leg swelling, and leg cramping.  Venous insufficiency/ reflux.\par \par Procedure: radiofrequency ablation of the left small saphenous vein. \par 	\par Mr. RONNY NAVARRETE is a 78 year old M with a history of left lower extremity varicose veins previously seen in the office.  Ultrasound examination demonstrated venous insufficiency. A trial of compression stockings, exercise, elevation, and pain medication was attempted without relief and definitive treatment with radiofrequency ablation was offered. \par \par The patient has come for radiofrequency ablation treatment of the left small  saphenous vein.\par I have discussed the risks of the procedure at length with the patient. The risks discussed were inclusive of but not limited to infection, irritation at the site of infiltration of local anesthesia, and also rare risk of deep venous thrombosis and pulmonary emboli. The patient agrees to proceed with the procedure. \par The patient was escorted into the procedure room and a time out called.\par The entire limb was prepped and draped in sterile fashion. The RF fiber was placed on the sterile field and connected by a sterile cable. Actuation, temperature, and impedance testing were performed to ensure that all components were connected and operating properly. \par The patient was placed on the procedure table and local anesthesia was instilled in the skin overlying the access site. Under ultrasound guidance, the vein was punctured with a micropuncture needle, using the anterior wall technique. A guide wire was now introduced through the needle, and the needle was then exchanged over the guide wire for a 7F sheath. The guide wire was removed and the RF probe was then placed into the left small saphenous vein through the sheath and position confirmed using ultrasound guidance. After the RF probe position was verified by ultrasound, tumescent anesthesia consisting of normal saline, 1% lidocaine with 8.4% sodium bicarbonate was infiltrated, under ultrasound guidance, precisely into the perivenous compartment along the entire length of the vein until a “halo” of fluid was noted around the vein. After RF probe position was again confirmed with ultrasound imaging, RF energy was applied. The probe was gradually and carefully withdrawn at a rate of 6.5cm/20seconds. \par 5 cycles of RF performed using the 7 cm probe\par Total treatment time was 100 seconds.\par The total volume injected was 200 cc\par Treatment length was 20 cm and \par The probe is > 3.5 cm from the SPJ.\par \par Estimated Blood Loss: minimal\par Repeat ultrasound of the treated vein was performed confirming successful treatment. The catheter and sheath were withdrawn and hemostasis established with direct pressure. After assuring hemostasis, a sterile 4x4 was placed on the access site and an ACE compression wrap was applied. Patient tolerated procedure well. Patient was given post-procedure instructions and follow up appointment was scheduled.\par \par \par

## 2023-03-27 RX ORDER — ALPRAZOLAM 0.5 MG/1
0.5 TABLET ORAL
Qty: 1 | Refills: 0 | Status: COMPLETED | COMMUNITY
Start: 2023-03-27 | End: 2023-04-07

## 2023-03-31 ENCOUNTER — APPOINTMENT (OUTPATIENT)
Dept: VASCULAR SURGERY | Facility: CLINIC | Age: 78
End: 2023-03-31
Payer: COMMERCIAL

## 2023-03-31 DIAGNOSIS — I83.893 VARICOSE VEINS OF BILATERAL LOWER EXTREMITIES WITH OTHER COMPLICATIONS: ICD-10-CM

## 2023-03-31 PROCEDURE — 93971 EXTREMITY STUDY: CPT | Mod: LT

## 2023-03-31 PROCEDURE — 36465Z: CUSTOM | Mod: RT

## 2023-03-31 NOTE — PROCEDURE
[FreeTextEntry1] : Ultrasound guided microfoam chemical ablation with Varithena® of the right distal great saphenous vein [FreeTextEntry3] : Procedural safety checklist and time out completed:\par Confirmed patient identification (Patient Name, , and/or medical record number including when possible affirmation by patient or parent/family/other).\par Confirmed procedure with the patient. Consent present, accurate and signed. \par Confirmed special equipment and supplies are present.\par Sterility confirmed. Position verified. \par Site/ side is marked and visible and confirmed. \par Procedure confirmed by consent. Accurate consent including side and site.\par Review of medical records, including venous ultrasound, noting correct procedure including site and side.\par MD/PA verifies presence and review of imaging studies and or written report of imaging studies.\par Agreement on the procedure to be performed\par Time out completed.\par All of the above has been confirmed by the team.\par All patient-specific concerns have been addressed. \par \par Indication: right lower extremity varicose veins with _ulcer, inflammation, leg pain, leg swelling, and leg cramping.  Venous insufficiency/ reflux.\par \par Procedure: Ultrasound guided microfoam chemical ablation with Varithena® of the right distal great saphenous vein\par 	\par Mr. RONNY NAVARRETE is a 78 year old M with a history of right lower extremity varicose veins and venous insufficiency previously seen in the office.  Ultrasound examination demonstrated venous insufficiency. A trial of compression stockings, exercise, elevation, and pain medication was attempted without relief and definitive treatment with radiofrequency ablation was offered. \par \par The patient has come for ultrasound guided microfoam chemical ablation with Varithena® of the right distal great saphenous vein\par I have discussed the risks of the procedure at length with the patient. The risks discussed were inclusive of but not limited to infection, irritation at the site of infiltration of local anesthesia, and also rare risk of deep venous thrombosis and pulmonary emboli. The patient agrees to proceed with the procedure. \par The patient was escorted into the procedure room and a time out called.\par \par The patient was placed on the procedure room table and was evaluated in reverse Trendelenburg position. The leg was prepped and sterile drapes applied. 1.0% lidocaine was administered at the chosen access site for local anesthesia. The vein was accessed using a 4F micro-puncture needle followed by a guide wire through the punctured needle and dilator sheath, standard IV catheters, or butterfly needle under advanced ultrasound guidance. Vein access was established and confirmed by aspiration of dark low pressure blood. After gaining access at distal medial ankle , the leg was positioned at 45 degrees of elevation in relationship to the torso. \par The Varithena canister was primed and purged as required by the instructions. A 4 mL aliquot was drawn into a sterile syringe then attached to the access device. \par \par Varithena was administered at 1.0 cc per second with close observation under ultrasound in its course of the GSV. The vein was observed for spasm under ultrasound, once vein was in full spasm the administration of Varithena was stopped \par \par After the administration of Varithena the patient was asked to dorsiflex the ankle to limit flow of foam into perforating veins. Once appropriate spasm had been confirmed in the treated veins, the access device was removed from the leg and light pressure was applied to the puncture site for hemostasis.\par \par The common femoral and deep superficial veins were then evaluated for flow and compressibility prior to dressing placement. The lower extremity was kept elevated at 45 degree angle and a multilayer dressing was applied including an under layer, compression pad, and thigh high 20-30 mmHg compression stocking to the patient. The leg was lowered only after compression had been applied. \par \par The patient ambulated 10 minutes under supervision and was without concerns at time of release. The patient was instructed to take an anti-inflammatory medicine as needed and to follow up for duplex scan of treated vein. \par \par Patient tolerated procedure, was given post-procedure instructions and a follow-up appt scheduled.\par Post-care instructions include walking, wearing compression during the day, taking off only to shower and then reapplying for two weeks, advising patient to keep post-treatment bandages in place and dry for 48 hours, avoid extended periods of inactivity, avoid heavy exercise for one week, to walk daily for 10 minutes over the next month. The patient was instructed to take an anti-inflammatory medicine as needed.\par LOT#  [id number]\par EXP 2024\par \par

## 2023-04-05 ENCOUNTER — APPOINTMENT (OUTPATIENT)
Dept: WOUND CARE | Facility: CLINIC | Age: 78
End: 2023-04-05

## 2023-04-07 ENCOUNTER — APPOINTMENT (OUTPATIENT)
Dept: VASCULAR SURGERY | Facility: CLINIC | Age: 78
End: 2023-04-07
Payer: COMMERCIAL

## 2023-04-07 PROCEDURE — 93971 EXTREMITY STUDY: CPT | Mod: RT

## 2023-04-07 PROCEDURE — 36465Z: CUSTOM | Mod: LT

## 2023-04-07 NOTE — PROCEDURE
[FreeTextEntry1] : Ultrasound guided microfoam chemical ablation with Varithena® of the left distal great saphenous vein [FreeTextEntry3] : Procedural safety checklist and time out completed:\par Confirmed patient identification (Patient Name, , and/or medical record number including when possible affirmation by patient or parent/family/other).\par Confirmed procedure with the patient. Consent present, accurate and signed. \par Confirmed special equipment and supplies are present.\par Sterility confirmed. Position verified. \par Site/ side is marked and visible and confirmed. \par Procedure confirmed by consent. Accurate consent including side and site.\par Review of medical records, including venous ultrasound, noting correct procedure including site and side.\par MD/PA verifies presence and review of imaging studies and or written report of imaging studies.\par Agreement on the procedure to be performed\par Time out completed.\par All of the above has been confirmed by the team.\par All patient-specific concerns have been addressed. \par \par Indication: left lower extremity varicose veins with ulcer, inflammation, leg pain, leg swelling, and leg cramping.  Venous insufficiency/ reflux.\par \par Procedure: Ultrasound guided microfoam chemical ablation with Varithena® of the left distal great saphenous vein\par 	\par Mr. RONNY NAVARRETE is a 78 year old M with a history of left lower extremity varicose veins and venous insufficiency previously seen in the office.  Ultrasound examination demonstrated venous insufficiency. A trial of compression stockings, exercise, elevation, and pain medication was attempted without relief and definitive treatment with radiofrequency ablation was offered. \par \par The patient has come for ultrasound guided microfoam chemical ablation with Varithena® of the left distal great saphenous vein\par I have discussed the risks of the procedure at length with the patient. The risks discussed were inclusive of but not limited to infection, irritation at the site of infiltration of local anesthesia, and also rare risk of deep venous thrombosis and pulmonary emboli. The patient agrees to proceed with the procedure. \par The patient was escorted into the procedure room and a time out called.\par \par The patient was placed on the procedure room table and was evaluated in reverse Trendelenburg position. The leg was prepped and sterile drapes applied. 1.0% lidocaine was administered at the chosen access site for local anesthesia. The vein was accessed using a 4F micro-puncture needle followed by a guide wire through the punctured needle and dilator sheath under advanced ultrasound guidance. Vein access was established and confirmed by aspiration of dark low pressure blood. After gaining access at distal medial ankle , the leg was positioned at 45 degrees of elevation in relationship to the torso. \par The Varithena canister was primed and purged as required by the instructions. A 5 mL aliquot was drawn into a sterile syringe then attached to the access device. \par \par Varithena was administered at 1.0 cc per second with close observation under ultrasound in its course of the GSV. The vein was observed for spasm under ultrasound, once vein was in full spasm the administration of Varithena was stopped \par \par After the administration of Varithena the patient was asked to dorsiflex the ankle to limit flow of foam into perforating veins. Once appropriate spasm had been confirmed in the treated veins, the access device was removed from the leg and light pressure was applied to the puncture site for hemostasis.\par \par The common femoral and deep superficial veins were then evaluated for flow and compressibility prior to dressing placement. The lower extremity was kept elevated at 45 degree angle and a multilayer dressing was applied including an under layer, compression pad, and thigh high 20-30 mmHg compression stocking to the patient. The leg was lowered only after compression had been applied. \par \par The patient ambulated 10 minutes under supervision and was without concerns at time of release. The patient was instructed to take an anti-inflammatory medicine as needed and to follow up for duplex scan of treated vein. \par \par Patient tolerated procedure, was given post-procedure instructions and a follow-up appt scheduled.\par Post-care instructions include walking, wearing compression during the day, taking off only to shower and then reapplying for two weeks, advising patient to keep post-treatment bandages in place and dry for 48 hours, avoid extended periods of inactivity, avoid heavy exercise for one week, to walk daily for 10 minutes over the next month. The patient was instructed to take an anti-inflammatory medicine as needed.\par LOT#  [id number]\par EXP 2024\par \par

## 2023-04-14 ENCOUNTER — APPOINTMENT (OUTPATIENT)
Dept: VASCULAR SURGERY | Facility: CLINIC | Age: 78
End: 2023-04-14
Payer: COMMERCIAL

## 2023-04-14 PROCEDURE — 93971 EXTREMITY STUDY: CPT | Mod: LT

## 2023-04-17 ENCOUNTER — APPOINTMENT (OUTPATIENT)
Dept: WOUND CARE | Facility: CLINIC | Age: 78
End: 2023-04-17

## 2023-04-24 ENCOUNTER — APPOINTMENT (OUTPATIENT)
Dept: WOUND CARE | Facility: CLINIC | Age: 78
End: 2023-04-24
Payer: COMMERCIAL

## 2023-04-24 VITALS — TEMPERATURE: 97.8 F

## 2023-04-24 PROCEDURE — 11042 DBRDMT SUBQ TIS 1ST 20SQCM/<: CPT

## 2023-04-24 NOTE — PLAN
[FreeTextEntry1] : 03/20/2023\par Plan:\par Apply lidocaine or topical anesthetic if needed to reduce pain upon washing the wound.\par Wash wound with Dove skin sensitive soap and clean water when showering\par Wash wounds with Vashe and pat dry before dressing applied. \par new supplies sent in so pt. can have appropriate supplies\par Apply Adaptic/Xtrasorb dressing.\par Apply multi-layer compression bandage ACE bandage\par Change dressing every other day\par Written instructions for dressing change given to patient.\par Leg elevation as tolerated\par order lymphedema pump\par \par 4/24/23\par Plan:\par Apply Drawtex/Osbaldo/Circaids\par Change every other day\par Leg elevation as tolerated\par RTO in two weeks\par Continue using lymphedema pump twice daily\par Encouraged ambulation or exercise.\par Wound supplies ordered via DME\par Patient given contact information to DME\par

## 2023-04-24 NOTE — ASSESSMENT
[FreeTextEntry1] : Wound Assessment and Plan:\par \par The patient presents with a wound to the bilateral lower extremities.  Swelling noted to the extremities.  \par Vascular procedures planned with Dr. Morrison in March.\par Bilateral palpable pulses. Moderate edema. Wound beds are red with large amount of yellow serous drainage.LLE wound with periwound erythema.\par Wounds with increased drainage and pain. Dressing changed to Adaptic and super absorber/Osbaldo/ACE wraps.\par s/p excisional debridement\par Compression garments ordered.\par \par \par 2/22/23\par bilateral leg wounds, right leg warm to touch, slight tenderness, has been using shilpi, telfa over wounds, incorrect supplies ordered, has not received wrap yet\par s/p excisional debridement\par He read the [facility] consent and signed it prior to the initiation of any screening procedures.  He had the opportunity to discuss any questions regarding the study.  I witnessed the signing of the consent and the patient was given a copy of the signed consent.\par \par \par 03/20/2023\par bilateral leg wounds, right leg warm to touch, slight tenderness, has been using shilpi, adaptic over wounds\par patient received circaid wraps\par s/p excisional debridement\par Patient underwent evaluation for peripheral arterial disease using a manual procedure  Ankle brachial index was obtained using blood pressure cuffs of the ankle and brachial segments of both legs.  The procedure was supervised and interpreted by the physician.  MAUDE of the right lower extremity 1.17.   MAUDE of the left lower extremity 1.1.  Waveform noted to be (triphasic).   Patient tolerated procedure well in the office.  Results discussed with the patient.\par Lower extremity wounds dressed with Adaptic/Drawtex/Osbaldo\par Patient educated by nurse in the proper use of Circaid compression garments\par Circaids placed on patient today\par Verified with company supplies to ship on March 23\par RTO to office in two weeks\par chronic lymphedema - has pump very old and not working\par \par 4/24/23\par Wound Assessment and Plan:\par The patient presents with a wounds to the bilateral lower extremities.  Minimal swelling noted to the extremities.  \par On exam:\par Wound beds with red granulation tissue, thin layer of yellow slough, no odor or erythema present, periwound skin dry and scaly\par No clinical sign of infection\par Recommendation:\par Apply lidocaine or topical anesthetic if needed to reduce pain upon washing the wound.\par Wash wound with Dove skin sensitive soap and clean water \par Apply Drawtex to the wound beds/Osbaldo/Circaids\par Change dressing --every other day\par Leg elevation as tolerated\par Encouraged ambulation or exercise.\par Optimization of nutrition.\par Continue using Lymphedema pump twice daily\par Wound supplies ordered via DME\par Patient given contact information to DME\par Follow up appointment scheduled for two weeks\par \par \par \par \par \par

## 2023-04-24 NOTE — PHYSICAL EXAM
[2+] : left 2+ [Ankle Swelling (On Exam)] : present [Ankle Swelling Bilaterally] : bilaterally  [Ankle Swelling On The Right] : mild [] : bilaterally [Skin Ulcer] : ulcer [Alert] : alert [Oriented to Person] : oriented to person [Oriented to Place] : oriented to place [Oriented to Time] : oriented to time [Calm] : calm [Please See PDF for [employer]] : Please See PDF for [employer]. [de-identified] : NAD obese [de-identified] : kwaku [de-identified] : supple [de-identified] : equal chest rise [de-identified] : bilateral lower extremities ulcer

## 2023-04-27 ENCOUNTER — RX RENEWAL (OUTPATIENT)
Age: 78
End: 2023-04-27

## 2023-05-04 NOTE — REASON FOR VISIT
[Home] : at home, [unfilled] , at the time of the visit. [Medical Office: ([facility])___] : at the medical office located in  [Verbal consent obtained from patient] : the patient, [unfilled]

## 2023-05-05 ENCOUNTER — APPOINTMENT (OUTPATIENT)
Dept: VASCULAR SURGERY | Facility: CLINIC | Age: 78
End: 2023-05-05
Payer: COMMERCIAL

## 2023-05-05 VITALS
BODY MASS INDEX: 30.64 KG/M2 | WEIGHT: 214 LBS | HEIGHT: 70 IN | SYSTOLIC BLOOD PRESSURE: 155 MMHG | TEMPERATURE: 98 F | HEART RATE: 51 BPM | DIASTOLIC BLOOD PRESSURE: 70 MMHG

## 2023-05-05 VITALS — HEART RATE: 58 BPM | DIASTOLIC BLOOD PRESSURE: 71 MMHG | SYSTOLIC BLOOD PRESSURE: 173 MMHG

## 2023-05-05 PROCEDURE — 99212 OFFICE O/P EST SF 10 MIN: CPT

## 2023-05-17 NOTE — PHYSICAL EXAM
[2+] : left 2+ [Calm] : calm [de-identified] : NAD [de-identified] : unlabored [FreeTextEntry1] : Bilateral nearly circumferential ulcerations at the ankles, superficial, scant serous discharge, no foul odor or purulence noted

## 2023-05-17 NOTE — HISTORY OF PRESENT ILLNESS
[FreeTextEntry1] : \par RONNY NAVARRETE (: 1945) is a 78 year old male with history of venous insufficiency. He is s/p vein procedures: bilateral SSV RFA and distal GSV Varithena. \par \par Post-procedure ultrasounds demonstrated successful closure of the vein and no evidence of DVT. \par \par Today the patient reports improvement in symptoms. Reports that bilateral venous stasis ulcers are slowly improving in appearance. He has an upcoming appointment with wound care. \par

## 2023-05-17 NOTE — ASSESSMENT
[FreeTextEntry1] : \par A/P: Patient is doing well s/p vein procedures. Continue management of venous stasis ulcers per wound care specialist. Vascular follow up as needed. \par

## 2023-05-26 NOTE — DISCUSSION/SUMMARY
[FreeTextEntry1] : Impression:\par \par 1. Permanent afib: EKG performed today to assess for presence of conduction disease and reveals afib, slow VR in the 40s. Review of prior EKGs reveals persistently bradycardic in the 40s. Not on AV nodals. Resume Eliquis for thromboembolic prophylaxis as prescribed. Patient has no issue walking 2 miles per day and on a treadmill at home his heart rate gets up to 110 bpm. \par \par 2. Hypothyroid: resume levothyroxine as prescribed and regular f/u with PCP for routine TFT monitoring and management.\par \par 3. HLD: resume statin therapy as prescribed and regular f/u with Cardiologist for routine lipid monitoring and management.\par \par 4. Obesity: patient encouraged to lose weight.

## 2023-05-30 ENCOUNTER — APPOINTMENT (OUTPATIENT)
Dept: ELECTROPHYSIOLOGY | Facility: CLINIC | Age: 78
End: 2023-05-30

## 2023-06-05 ENCOUNTER — APPOINTMENT (OUTPATIENT)
Dept: WOUND CARE | Facility: CLINIC | Age: 78
End: 2023-06-05
Payer: COMMERCIAL

## 2023-06-05 VITALS
OXYGEN SATURATION: 97 % | TEMPERATURE: 97.8 F | HEART RATE: 43 BPM | BODY MASS INDEX: 30.64 KG/M2 | WEIGHT: 214 LBS | HEIGHT: 70 IN | SYSTOLIC BLOOD PRESSURE: 154 MMHG | DIASTOLIC BLOOD PRESSURE: 67 MMHG

## 2023-06-05 PROCEDURE — 11042 DBRDMT SUBQ TIS 1ST 20SQCM/<: CPT

## 2023-06-05 NOTE — ASSESSMENT
[FreeTextEntry1] : Wound Assessment and Plan:\par \par The patient presents with a wound to the bilateral lower extremities.  Swelling noted to the extremities.  \par Vascular procedures planned with Dr. Morrison in March.\par Bilateral palpable pulses. Moderate edema. Wound beds are red with large amount of yellow serous drainage.LLE wound with periwound erythema.\par Wounds with increased drainage and pain. Dressing changed to Adaptic and super absorber/Osbaldo/ACE wraps.\par s/p excisional debridement\par Compression garments ordered.\par \par \par 2/22/23\par bilateral leg wounds, right leg warm to touch, slight tenderness, has been using shilpi, telfa over wounds, incorrect supplies ordered, has not received wrap yet\par s/p excisional debridement\par He read the [facility] consent and signed it prior to the initiation of any screening procedures.  He had the opportunity to discuss any questions regarding the study.  I witnessed the signing of the consent and the patient was given a copy of the signed consent.\par \par \par 03/20/2023\par bilateral leg wounds, right leg warm to touch, slight tenderness, has been using shilpi, adaptic over wounds\par patient received circaid wraps\par s/p excisional debridement\par Patient underwent evaluation for peripheral arterial disease using a manual procedure  Ankle brachial index was obtained using blood pressure cuffs of the ankle and brachial segments of both legs.  The procedure was supervised and interpreted by the physician.  MAUDE of the right lower extremity 1.17.   MAUDE of the left lower extremity 1.1.  Waveform noted to be (triphasic).   Patient tolerated procedure well in the office.  Results discussed with the patient.\par Lower extremity wounds dressed with Adaptic/Drawtex/Osbaldo\par Patient educated by nurse in the proper use of Circaid compression garments\par Circaids placed on patient today\par Verified with company supplies to ship on March 23\par RTO to office in two weeks\par chronic lymphedema - has pump very old and not working\par \par 4/24/23\par Wound Assessment and Plan:\par The patient presents with a wounds to the bilateral lower extremities.  Minimal swelling noted to the extremities.  \par On exam:\par Wound beds with red granulation tissue, thin layer of yellow slough, no odor or erythema present, periwound skin dry and scaly\par No clinical sign of infection\par Recommendation:\par Apply lidocaine or topical anesthetic if needed to reduce pain upon washing the wound.\par Wash wound with Dove skin sensitive soap and clean water \par Apply Drawtex to the wound beds/Osbaldo/Circaids\par Change dressing --every other day\par Leg elevation as tolerated\par Encouraged ambulation or exercise.\par Optimization of nutrition.\par Continue using Lymphedema pump twice daily\par Wound supplies ordered via DME\par Patient given contact information to DME\par Follow up appointment scheduled for two weeks\par \par 06/05/2023\par Wound Assessment and Plan:\par The patient presents with a wounds to the bilateral lower extremities.  swelling noted to the extremities.  pt complaints of increased pain at night and when ambulating\par On exam:\par Wound beds with red granulation tissue, thin layer of yellow slough, no odor or erythema present, periwound skin dry and scaly\par No clinical sign of infection\par s/p excisional debridement\par Dynaflex will be applied today\par /\par The patient was positioned to allow for optimization of imaging. The fluorescence imaging device was placed 8-12 cm from the patient and the clinical darkness required for imaging was obtained by a dark drape. The wound measured () on the () . The [place] i:X fluorescence imaging device was used to report presence and location of cyan fluorescence, indicative of bacteria at loads greater than 104 CFU/g in real-time. Images reported to have cyan fluorescence. The wound was mechanically cleansed with Dakins 0.125% and underwent excisional debridement. Fluorescence images acquired after cleansing demonstrated reduction in bacteria.\par \par Patient enrolled in affinity study, signed the consents\par  \par \par \par \par \par

## 2023-06-05 NOTE — PHYSICAL EXAM
[2+] : left 2+ [Ankle Swelling (On Exam)] : present [Ankle Swelling Bilaterally] : bilaterally  [Ankle Swelling On The Right] : mild [] : bilaterally [Skin Ulcer] : ulcer [Alert] : alert [Oriented to Person] : oriented to person [Oriented to Place] : oriented to place [Oriented to Time] : oriented to time [Calm] : calm [Please See PDF for [employer]] : Please See PDF for [employer]. [de-identified] : NAD obese [de-identified] : kwaku [de-identified] : supple [de-identified] : equal chest rise [de-identified] : bilateral lower extremities ulcer

## 2023-06-05 NOTE — END OF VISIT
[Time Spent: ___ minutes] : I have spent [unfilled] minutes of time on the encounter. [>50% of the face to face encounter time was spent on counseling and/or coordination of care for ___] : Greater than 50% of the face to face encounter time was spent on counseling and/or coordination of care for [unfilled] A-T Advancement Flap Text: The defect edges were debeveled with a #15 scalpel blade.  Given the location of the defect, shape of the defect and the proximity to free margins an A-T advancement flap was deemed most appropriate.  Using a sterile surgical marker, an appropriate advancement flap was drawn incorporating the defect and placing the expected incisions within the relaxed skin tension lines where possible.    The area thus outlined was incised deep to adipose tissue with a #15 scalpel blade.  The skin margins were undermined to an appropriate distance in all directions utilizing iris scissors.

## 2023-06-05 NOTE — PLAN
[FreeTextEntry1] : 03/20/2023\par Plan:\par Apply lidocaine or topical anesthetic if needed to reduce pain upon washing the wound.\par Wash wound with Dove skin sensitive soap and clean water when showering\par Wash wounds with Vashe and pat dry before dressing applied. \par new supplies sent in so pt. can have appropriate supplies\par Apply Adaptic/Xtrasorb dressing.\par Apply multi-layer compression bandage ACE bandage\par Change dressing every other day\par Written instructions for dressing change given to patient.\par Leg elevation as tolerated\par order lymphedema pump\par \par 4/24/23\par Plan:\par Apply Drawtex/Osbaldo/Circaids\par Change every other day\par Leg elevation as tolerated\par RTO in two weeks\par Continue using lymphedema pump twice daily\par Encouraged ambulation or exercise.\par Wound supplies ordered via DME\par Patient given contact information to DME\par \par 06/05/2023\par s/p excisional debridement\par Apply Drawtex/Osbaldo/Circaids\par Change every other day\par Leg elevation as tolerated\par RTO in two weeks\par Continue using lymphedema pump twice daily\par Encouraged ambulation or exercise.\par Wound supplies ordered via DME\par Patient given contact information to DME

## 2023-06-06 ENCOUNTER — APPOINTMENT (OUTPATIENT)
Dept: ELECTROPHYSIOLOGY | Facility: CLINIC | Age: 78
End: 2023-06-06
Payer: COMMERCIAL

## 2023-06-06 ENCOUNTER — NON-APPOINTMENT (OUTPATIENT)
Age: 78
End: 2023-06-06

## 2023-06-06 VITALS
BODY MASS INDEX: 30.64 KG/M2 | SYSTOLIC BLOOD PRESSURE: 160 MMHG | OXYGEN SATURATION: 93 % | HEIGHT: 70 IN | DIASTOLIC BLOOD PRESSURE: 56 MMHG | WEIGHT: 214 LBS | HEART RATE: 53 BPM

## 2023-06-06 DIAGNOSIS — R53.83 OTHER MALAISE: ICD-10-CM

## 2023-06-06 DIAGNOSIS — R53.81 OTHER MALAISE: ICD-10-CM

## 2023-06-06 PROCEDURE — 99213 OFFICE O/P EST LOW 20 MIN: CPT

## 2023-06-06 PROCEDURE — 93000 ELECTROCARDIOGRAM COMPLETE: CPT

## 2023-06-06 RX ORDER — AMOXICILLIN AND CLAVULANATE POTASSIUM 875; 125 MG/1; MG/1
875-125 TABLET, COATED ORAL
Qty: 10 | Refills: 0 | Status: DISCONTINUED | COMMUNITY
Start: 2023-02-22 | End: 2023-06-06

## 2023-06-06 RX ORDER — HYDROCORTISONE 1 %
12 CREAM (GRAM) TOPICAL TWICE DAILY
Qty: 1 | Refills: 3 | Status: DISCONTINUED | COMMUNITY
Start: 2023-01-03 | End: 2023-06-06

## 2023-06-06 NOTE — HISTORY OF PRESENT ILLNESS
[FreeTextEntry1] : Tyler Mendenhall is a 79y/o man with Hx of permanent atrial fibrillation on Eliquis s/p DCCV in 2014 varicose vein, who presents today for routine f/u.  States he is feeling well, but does have fatigue.  States used to walk about 2 miles daily but not now venous ulcers, had ablation and currently receiving wound care. States he did not do  stress test yet. On Eliquis, no s/s of bleeding and compliant with the medication, Denies chest pain, palpitations, SOB, syncope or near syncope.\par \par \par

## 2023-06-06 NOTE — DISCUSSION/SUMMARY
[EKG obtained to assist in diagnosis and management of assessed problem(s)] : EKG obtained to assist in diagnosis and management of assessed problem(s) [FreeTextEntry1] : Impression:\par \par 1. Permanent afib: EKG performed today to assess for presence of conduction disease and reveals afib, slow VR in the 50s.  Review of prior EKGs reveals persistently bradycardic in the 40s. Echo 11/29/21 EF 69% MOD Mitral regurgitation Not on AV nodals. Resume Eliquis for thromboembolic prophylaxis as prescribed.  \par \par 2. Hypothyroid: resume levothyroxine as prescribed and regular f/u with PCP for routine TFT monitoring and management.\par \par 3. HLD: resume statin therapy as prescribed and regular f/u with Cardiologist for routine lipid monitoring and management.\par \par 4. Obesity: patient encouraged to lose weight.

## 2023-06-06 NOTE — PHYSICAL EXAM

## 2023-06-12 ENCOUNTER — APPOINTMENT (OUTPATIENT)
Dept: WOUND CARE | Facility: CLINIC | Age: 78
End: 2023-06-12
Payer: COMMERCIAL

## 2023-06-12 ENCOUNTER — OUTPATIENT (OUTPATIENT)
Dept: OUTPATIENT SERVICES | Facility: HOSPITAL | Age: 78
LOS: 1 days | End: 2023-06-12
Payer: COMMERCIAL

## 2023-06-12 DIAGNOSIS — I83.024 VARICOSE VEINS OF LEFT LOWER EXTREMITY WITH ULCER OF HEEL AND MIDFOOT: ICD-10-CM

## 2023-06-12 DIAGNOSIS — L97.422 VARICOSE VEINS OF LEFT LOWER EXTREMITY WITH ULCER OF HEEL AND MIDFOOT: ICD-10-CM

## 2023-06-12 PROCEDURE — 11042 DBRDMT SUBQ TIS 1ST 20SQCM/<: CPT

## 2023-06-12 PROCEDURE — 11045 DBRDMT SUBQ TISS EACH ADDL: CPT

## 2023-06-12 NOTE — PLAN
[FreeTextEntry1] : 03/20/2023\par Plan:\par Apply lidocaine or topical anesthetic if needed to reduce pain upon washing the wound.\par Wash wound with Dove skin sensitive soap and clean water when showering\par Wash wounds with Vashe and pat dry before dressing applied. \par new supplies sent in so pt. can have appropriate supplies\par Apply Adaptic/Xtrasorb dressing.\par Apply multi-layer compression bandage ACE bandage\par Change dressing every other day\par Written instructions for dressing change given to patient.\par Leg elevation as tolerated\par order lymphedema pump\par \par 4/24/23\par Plan:\par Apply Drawtex/Osbaldo/Circaids\par Change every other day\par Leg elevation as tolerated\par RTO in two weeks\par Continue using lymphedema pump twice daily\par Encouraged ambulation or exercise.\par Wound supplies ordered via DME\par Patient given contact information to DME\par \par 06/05/2023\par s/p excisional debridement\par Apply Drawtex/Osbaldo/Circaids\par Change every other day\par Leg elevation as tolerated\par RTO in two weeks\par Continue using lymphedema pump twice daily\par Encouraged ambulation or exercise.\par Wound supplies ordered via DME\par Patient given contact information to DME\[facility], [place] \par 6/12/23\par Plan:\par Apply Drawtex/Osbaldo/Circaids\par Change every other day\par Leg elevation as tolerated\par RTO in two weeks\par Continue using lymphedema pump twice daily\par Encouraged ambulation or exercise.\par

## 2023-06-12 NOTE — PHYSICAL EXAM
[2+] : left 2+ [Ankle Swelling (On Exam)] : present [Ankle Swelling Bilaterally] : bilaterally  [Ankle Swelling On The Right] : mild [] : bilaterally [Skin Ulcer] : ulcer [Alert] : alert [Oriented to Person] : oriented to person [Oriented to Place] : oriented to place [Oriented to Time] : oriented to time [Calm] : calm [Please See PDF for [employer]] : Please See PDF for [employer]. [de-identified] : NAD obese [de-identified] : kwaku [de-identified] : supple [de-identified] : equal chest rise [de-identified] : bilateral lower extremities ulcer

## 2023-06-12 NOTE — ASSESSMENT
[FreeTextEntry1] : Wound Assessment and Plan:\par \par The patient presents with a wound to the bilateral lower extremities.  Swelling noted to the extremities.  \par Vascular procedures planned with Dr. Morrison in March.\par Bilateral palpable pulses. Moderate edema. Wound beds are red with large amount of yellow serous drainage.LLE wound with periwound erythema.\par Wounds with increased drainage and pain. Dressing changed to Adaptic and super absorber/Osbaldo/ACE wraps.\par s/p excisional debridement\par Compression garments ordered.\par \par \par 2/22/23\par bilateral leg wounds, right leg warm to touch, slight tenderness, has been using shilpi, telfa over wounds, incorrect supplies ordered, has not received wrap yet\par s/p excisional debridement\par He read the [facility] consent and signed it prior to the initiation of any screening procedures.  He had the opportunity to discuss any questions regarding the study.  I witnessed the signing of the consent and the patient was given a copy of the signed consent.\par \par \par 03/20/2023\par bilateral leg wounds, right leg warm to touch, slight tenderness, has been using shilpi, adaptic over wounds\par patient received circaid wraps\par s/p excisional debridement\par Patient underwent evaluation for peripheral arterial disease using a manual procedure  Ankle brachial index was obtained using blood pressure cuffs of the ankle and brachial segments of both legs.  The procedure was supervised and interpreted by the physician.  MAUDE of the right lower extremity 1.17.   MAUDE of the left lower extremity 1.1.  Waveform noted to be (triphasic).   Patient tolerated procedure well in the office.  Results discussed with the patient.\par Lower extremity wounds dressed with Adaptic/Drawtex/Osbaldo\par Patient educated by nurse in the proper use of Circaid compression garments\par Circaids placed on patient today\par Verified with company supplies to ship on March 23\par RTO to office in two weeks\par chronic lymphedema - has pump very old and not working\par \par 4/24/23\par Wound Assessment and Plan:\par The patient presents with a wounds to the bilateral lower extremities.  Minimal swelling noted to the extremities.  \par On exam:\par Wound beds with red granulation tissue, thin layer of yellow slough, no odor or erythema present, periwound skin dry and scaly\par No clinical sign of infection\par Recommendation:\par Apply lidocaine or topical anesthetic if needed to reduce pain upon washing the wound.\par Wash wound with Dove skin sensitive soap and clean water \par Apply Drawtex to the wound beds/Osbaldo/Circaids\par Change dressing --every other day\par Leg elevation as tolerated\par Encouraged ambulation or exercise.\par Optimization of nutrition.\par Continue using Lymphedema pump twice daily\par Wound supplies ordered via DME\par Patient given contact information to DME\par Follow up appointment scheduled for two weeks\par \par 06/05/2023\par Wound Assessment and Plan:\par The patient presents with a wounds to the bilateral lower extremities.  swelling noted to the extremities.  pt complaints of increased pain at night and when ambulating\par On exam:\par Wound beds with red granulation tissue, thin layer of yellow slough, no odor or erythema present, periwound skin dry and scaly\par No clinical sign of infection\par s/p excisional debridement\par Dynaflex will be applied today\par /\par The patient was positioned to allow for optimization of imaging. The fluorescence imaging device was placed 8-12 cm from the patient and the clinical darkness required for imaging was obtained by a dark drape. The wound measured () on the () . The iLogon i:X fluorescence imaging device was used to report presence and location of cyan fluorescence, indicative of bacteria at loads greater than 104 CFU/g in real-time. Images reported to have cyan fluorescence. The wound was mechanically cleansed with Dakins 0.125% and underwent excisional debridement. Fluorescence images acquired after cleansing demonstrated reduction in bacteria.\par \par Patient enrolled in affinity study, signed the consents\par \par 6/12/23\par The patient presents with a wounds to the bilateral lower extremities. Swelling noted to the extremities. Patient reports a decrease in pain and drainage.\par On exam:\par Wound beds with red granulation tissue, thin layer of yellow slough, no odor or erythema present, periwound skin dry and scaly\par No clinical sign of infection\par s/p excisional debridement\par Drawtex/Osbaldo/ACE wraps\par Will continue to use daily compression garments\par Lymphedema pump daily.\par The patient was positioned to allow for optimization of imaging. The fluorescence imaging device was placed 8-12 cm from the patient and the clinical darkness required for imaging was obtained by a dark drape. The wound measured see TA. The iLogon i:X fluorescence imaging device was used to report presence and location of cyan fluorescence, indicative of bacteria at loads greater than 104 CFU/g in real-time. Images reported to have cyan fluorescence. The wound was mechanically cleansed with Vashe and underwent excisional debridement. Fluorescence images acquired after cleansing demonstrated reduction in bacteria.\par \par Patient enrolled in affinity study, signed the consents\par \par  \par \par \par \par \par

## 2023-06-23 ENCOUNTER — APPOINTMENT (OUTPATIENT)
Dept: CARDIOLOGY | Facility: CLINIC | Age: 78
End: 2023-06-23
Payer: COMMERCIAL

## 2023-06-23 VITALS
DIASTOLIC BLOOD PRESSURE: 69 MMHG | HEIGHT: 70 IN | BODY MASS INDEX: 30.06 KG/M2 | HEART RATE: 52 BPM | WEIGHT: 210 LBS | SYSTOLIC BLOOD PRESSURE: 148 MMHG | OXYGEN SATURATION: 97 %

## 2023-06-23 DIAGNOSIS — I83.893 VARICOSE VEINS OF BILATERAL LOWER EXTREMITIES WITH OTHER COMPLICATIONS: ICD-10-CM

## 2023-06-23 DIAGNOSIS — M79.606 PAIN IN LEG, UNSPECIFIED: ICD-10-CM

## 2023-06-23 PROCEDURE — 93000 ELECTROCARDIOGRAM COMPLETE: CPT

## 2023-06-23 PROCEDURE — 99204 OFFICE O/P NEW MOD 45 MIN: CPT

## 2023-06-26 DIAGNOSIS — L97.929 NON-PRESSURE CHRONIC ULCER OF UNSPECIFIED PART OF LEFT LOWER LEG WITH UNSPECIFIED SEVERITY: ICD-10-CM

## 2023-06-26 DIAGNOSIS — L97.422 NON-PRESSURE CHRONIC ULCER OF LEFT HEEL AND MIDFOOT WITH FAT LAYER EXPOSED: ICD-10-CM

## 2023-06-26 DIAGNOSIS — L97.919 NON-PRESSURE CHRONIC ULCER OF UNSPECIFIED PART OF RIGHT LOWER LEG WITH UNSPECIFIED SEVERITY: ICD-10-CM

## 2023-06-26 DIAGNOSIS — I87.333 CHRONIC VENOUS HYPERTENSION (IDIOPATHIC) WITH ULCER AND INFLAMMATION OF BILATERAL LOWER EXTREMITY: ICD-10-CM

## 2023-06-26 DIAGNOSIS — I83.024 VARICOSE VEINS OF LEFT LOWER EXTREMITY WITH ULCER OF HEEL AND MIDFOOT: ICD-10-CM

## 2023-06-28 ENCOUNTER — APPOINTMENT (OUTPATIENT)
Dept: WOUND CARE | Facility: CLINIC | Age: 78
End: 2023-06-28

## 2023-07-01 PROBLEM — I83.893 VARICOSE VEINS OF BILATERAL LOWER EXTREMITIES WITH OTHER COMPLICATIONS: Status: ACTIVE | Noted: 2023-02-09

## 2023-07-03 ENCOUNTER — OUTPATIENT (OUTPATIENT)
Dept: OUTPATIENT SERVICES | Facility: HOSPITAL | Age: 78
LOS: 1 days | End: 2023-07-03
Payer: COMMERCIAL

## 2023-07-03 ENCOUNTER — APPOINTMENT (OUTPATIENT)
Dept: WOUND CARE | Facility: CLINIC | Age: 78
End: 2023-07-03
Payer: COMMERCIAL

## 2023-07-03 VITALS
OXYGEN SATURATION: 96 % | DIASTOLIC BLOOD PRESSURE: 74 MMHG | SYSTOLIC BLOOD PRESSURE: 151 MMHG | BODY MASS INDEX: 30.06 KG/M2 | HEART RATE: 54 BPM | RESPIRATION RATE: 17 BRPM | WEIGHT: 210 LBS | HEIGHT: 70 IN

## 2023-07-03 PROCEDURE — 11042 DBRDMT SUBQ TIS 1ST 20SQCM/<: CPT

## 2023-07-03 RX ORDER — TRAMADOL HYDROCHLORIDE 50 MG/1
50 TABLET, COATED ORAL
Qty: 14 | Refills: 0 | Status: ACTIVE | COMMUNITY
Start: 2023-02-22 | End: 1900-01-01

## 2023-07-03 NOTE — PLAN
[FreeTextEntry1] : 03/20/2023\par Plan:\par Apply lidocaine or topical anesthetic if needed to reduce pain upon washing the wound.\par Wash wound with Dove skin sensitive soap and clean water when showering\par Wash wounds with Vashe and pat dry before dressing applied. \par new supplies sent in so pt. can have appropriate supplies\par Apply Adaptic/Xtrasorb dressing.\par Apply multi-layer compression bandage ACE bandage\par Change dressing every other day\par Written instructions for dressing change given to patient.\par Leg elevation as tolerated\par order lymphedema pump\par \par 4/24/23\par Plan:\par Apply Drawtex/Osbaldo/Circaids\par Change every other day\par Leg elevation as tolerated\par RTO in two weeks\par Continue using lymphedema pump twice daily\par Encouraged ambulation or exercise.\par Wound supplies ordered via DME\par Patient given contact information to DME\par \par 06/05/2023\par s/p excisional debridement\par Apply Drawtex/Osbaldo/Circaids\par Change every other day\par Leg elevation as tolerated\par RTO in two weeks\par Continue using lymphedema pump twice daily\par Encouraged ambulation or exercise.\par Wound supplies ordered via DME\par Patient given contact information to DME\par \par 6/12/23\par Plan:\par Apply Drawtex/Osbaldo/Circaids\par Change every other day\par Leg elevation as tolerated\par RTO in two weeks\par Continue using lymphedema pump twice daily\par Encouraged ambulation or exercise.\par \par 07/03/2023\par Apply Drawtex/Osbaldo/Circaids\par Change every other day\par Leg elevation as tolerated\par RTO in two weeks\par Continue using lymphedema pump twice daily\par Encouraged ambulation or exercise.\par

## 2023-07-03 NOTE — PHYSICAL EXAM
[2+] : left 2+ [Ankle Swelling (On Exam)] : present [Ankle Swelling Bilaterally] : bilaterally  [Ankle Swelling On The Right] : mild [] : bilaterally [Skin Ulcer] : ulcer [Alert] : alert [Oriented to Person] : oriented to person [Oriented to Place] : oriented to place [Oriented to Time] : oriented to time [Calm] : calm [Please See PDF for [employer]] : Please See PDF for [employer]. [de-identified] : NAD obese [de-identified] : kwaku [de-identified] : supple [de-identified] : equal chest rise [de-identified] : bilateral lower extremities ulcer

## 2023-07-05 ENCOUNTER — APPOINTMENT (OUTPATIENT)
Dept: PAIN MANAGEMENT | Facility: CLINIC | Age: 78
End: 2023-07-05
Payer: COMMERCIAL

## 2023-07-05 DIAGNOSIS — M79.10 MYALGIA, UNSPECIFIED SITE: ICD-10-CM

## 2023-07-05 DIAGNOSIS — M79.2 NEURALGIA AND NEURITIS, UNSPECIFIED: ICD-10-CM

## 2023-07-05 PROCEDURE — 99203 OFFICE O/P NEW LOW 30 MIN: CPT | Mod: 95

## 2023-07-05 RX ORDER — METHOCARBAMOL 500 MG/1
500 TABLET, FILM COATED ORAL
Qty: 60 | Refills: 1 | Status: ACTIVE | COMMUNITY
Start: 2023-07-05 | End: 1900-01-01

## 2023-07-05 NOTE — ASSESSMENT
[FreeTextEntry1] : Mr. RONNY NAVARRETE is a 78 year M suffering from leg pain, that based upon today's subjective complaints, physical examination, and chart review, is likely multifactorial with possible element of myalgia in conjunciton with neuropathic pain\par \par >> Medications\par \par Chronic opioid use for non-malignant pain, in particular at high doses would not be recommended since it can potentially lead to hyperalgesia (hypersensitivity), tolerance and addiction. \par  \par Cw Gabapentin 100 mg - consider inc to TID\par \par Begin Robaxin 500 mg po bid prn spasms\par \par >> Interventions\par  \par None indicated at this time\par  \par >> Therapy and Other Modalities\par  \par Continue with daily home stretching regimen\par \par >> Imaging and Other Studies\par \par See Media \par \par >> Consults\par \par None ordered

## 2023-07-05 NOTE — CONSULT LETTER
[Dear  ___] : Dear  [unfilled], [Consult Letter:] : I had the pleasure of evaluating your patient, [unfilled]. [Please see my note below.] : Please see my note below. [Consult Closing:] : Thank you very much for allowing me to participate in the care of this patient.  If you have any questions, please do not hesitate to contact me. [Sincerely,] : Sincerely, [FreeTextEntry3] : Jose D Paredes DO

## 2023-07-05 NOTE — PHYSICAL EXAM
[de-identified] : Physical Exam (Telemedicine): \par Gen: AAOX3, NAD\par HEENT: PERRLA, EOMI, NCAT, NP\par Pulmonary: No Signs of Respiratory Distress\par MSK: AROM WFL, Limitations painful LE Rom\par Neurological: Grossly neurologically intact, Noted deficits none\par Gait: Normal, Non-antalgic, Sans AD\par Derm: No Rash, (-)Lesions, (-)Erythema, (-)Cyanosis \par Psych: Calm, Cooperative, Conversational\par \par Disclaimer: This is a limited examination for the purposes of conducting a telemedicine visit during the COVID-19 pandemic. Physical exam maneuvers were modified as necessary to allow patient to self perform. A focused physician physical examination will be performed during in person visits and should be referred to to determine need for further testing, interventions or degree of disability.

## 2023-07-05 NOTE — HISTORY OF PRESENT ILLNESS
[Home] : at home, [unfilled] , at the time of the visit. [Medical Office: ([facility])___] : at the medical office located in  [Verbal consent obtained from patient] : the patient, [unfilled] [FreeTextEntry1] : HPI - Mr. RONNY NAVARRETE is a 78 year M with PHx atrial fibrillation, DM 2, bilateral lower extremity wounds as below, referred by [facility] MD;who presents today with chief complaint of leg pain. Reports that it developed about 6 months ago, developing this past December. The pain is located in the lower extremites;  there is radiation of the pain into the feet. The pain is presently 10/10 in severity on the numerical rating scale. It is burning in nature. The pain is constant. There is diurnal worsening, during the night The pain is aggravated with activity, pressure. The pain improves with rest. The pain is functionally and emotionally disabling for the patient as its preventing them from going about activities of daily living, such as routine housewor. The pain does impair the patient’s ability to sleep. \par \par Patient was NOT  been seen by pain management in the past.\par Patient attests to  6 weeks of provider directed treatment, including a provider directed stretching regimen, on Gabapentin 100 mg po bid\par Patient reports treatment that occurred within the last 3 months\par Patient report that symptoms have been persistent and and progressing after treatment\par  \par Reports there is NO present numbness, there is NO weakness. Patient denies any bowel/bladder incontinence, no saddle/perineal anesthesia or any other red flag signs or symptoms. Reports regular BMs.\par

## 2023-07-07 ENCOUNTER — APPOINTMENT (OUTPATIENT)
Dept: WOUND CARE | Facility: CLINIC | Age: 78
End: 2023-07-07

## 2023-07-10 ENCOUNTER — APPOINTMENT (OUTPATIENT)
Dept: WOUND CARE | Facility: CLINIC | Age: 78
End: 2023-07-10

## 2023-07-10 ENCOUNTER — APPOINTMENT (OUTPATIENT)
Dept: WOUND CARE | Facility: CLINIC | Age: 78
End: 2023-07-10
Payer: COMMERCIAL

## 2023-07-10 ENCOUNTER — OUTPATIENT (OUTPATIENT)
Dept: OUTPATIENT SERVICES | Facility: HOSPITAL | Age: 78
LOS: 1 days | End: 2023-07-10
Payer: COMMERCIAL

## 2023-07-10 ENCOUNTER — APPOINTMENT (OUTPATIENT)
Dept: ENDOCRINOLOGY | Facility: CLINIC | Age: 78
End: 2023-07-10
Payer: COMMERCIAL

## 2023-07-10 VITALS
HEIGHT: 70 IN | HEART RATE: 54 BPM | OXYGEN SATURATION: 99 % | DIASTOLIC BLOOD PRESSURE: 80 MMHG | WEIGHT: 203 LBS | SYSTOLIC BLOOD PRESSURE: 150 MMHG | BODY MASS INDEX: 29.06 KG/M2

## 2023-07-10 VITALS
OXYGEN SATURATION: 96 % | HEIGHT: 70 IN | DIASTOLIC BLOOD PRESSURE: 62 MMHG | SYSTOLIC BLOOD PRESSURE: 149 MMHG | TEMPERATURE: 97.9 F | BODY MASS INDEX: 30.06 KG/M2 | HEART RATE: 73 BPM | RESPIRATION RATE: 16 BRPM | WEIGHT: 210 LBS

## 2023-07-10 DIAGNOSIS — E66.9 OBESITY, UNSPECIFIED: ICD-10-CM

## 2023-07-10 LAB — HBA1C MFR BLD HPLC: 6.3

## 2023-07-10 PROCEDURE — 99214 OFFICE O/P EST MOD 30 MIN: CPT | Mod: 25

## 2023-07-10 PROCEDURE — 11042 DBRDMT SUBQ TIS 1ST 20SQCM/<: CPT

## 2023-07-10 PROCEDURE — 83036 HEMOGLOBIN GLYCOSYLATED A1C: CPT | Mod: QW

## 2023-07-10 NOTE — DATA REVIEWED
[FreeTextEntry1] : March 2022\par A1c 6.6%\par Sodium 135\par Potassium 4.0\par Chloride 103\par CO2 23\par eGFR 48\par  mg/dl \par Vitamin B12 150 L

## 2023-07-10 NOTE — PHYSICAL EXAM

## 2023-07-10 NOTE — HISTORY OF PRESENT ILLNESS
[FreeTextEntry1] : Mr. RONNY NAVARRETE is 78 year old man with history of Type 2 DM diagnosed \par He was following with Dr. Fernández but Dr. Fernández no longer takes his insurance therefore switching. \par He was diagnosed with Type 2 DM for about 2012.  \par Past medication for DM:\par He doesn't recall completely \par He is currently on metformin 1000mg bid but he stopped the medication because it ran out\par He was started on Farxiga 5mg once daily \par He monitor his glucose twice a week.\par Usulaly in 130s.  This is fasting before eating. \par His A1C is 6.5% Dec 2022. \par He is up to date with his eye doctor. \par \par He was having itching on his leg and he had ulcers.  \par He was working in the garden in the summer time and he was scratching a lot. \par It is currently bandaged.  Patient had prior follow-up with vascular and wound care.  Last seen by wound care about 1-1/2 years ago.\par \par Regarding hyperlipidemia, currently taking atorvastatin 20 mg once daily.\par Regarding hypothyroidism, currently taking levothyroxine 75 mcg once daily.  Reporting the medication the morning with an empty stomach.  Waiting at least 30 minutes prior to eating.\par \par He take LT4 75mcg daily \par \par \par He makes sop \par B: Usually has soup for breakfast, chicken or carrot soup. \par S: most of the time no snack between breakfast and dinner\par L: Sometimes skips lunch, if he does eat lunch, usually have fruits, apple, pear or plum\par D: A little rice, salad.   \par \par Lives at home with his wife. \par \par He is a retired .  He taught science. \par \par He states that he does not have a history of hypertension.  He denies any history of hyperlipidemia. \par \par He follows up with ophthalmologist once a year, he states that he has no history of diabetic retinoathy.  \par He follows up with podiatrist as well.  \par \par

## 2023-07-10 NOTE — ASSESSMENT
[FreeTextEntry1] : Mr. RONNY NAVARRETE is a 76 year old man with history of Type 2 DM, here to establish care, A1C at goal of 6.6% today, with CKD stage 3A, non known hypertension, no known hyperlipdiemia, hypothyroidism, here to establish endocrine visit.  He was following up with Dr. Fernández but transferring care here as Dr. Fernández is no longer on his insurance network.  \par \par 1.  Type 2 diabetes mellitus\par A1c 6.3% which is currently at goal\par Patient is tolerating metformin and Farxiga with no issues.\par Checking glucose about once daily, mostly at goal\par Therefore recommend patient to continue with his current regimen.\par DM regimen:\par –Metformin  mg twice daily\par –Farxiga 10 mg once daily\par \par Counseled patient on some of the side effects of SGLT2 inhibitor including dehydration, vaginal yeast infections, yeast infections of the penis, ketoacidosis, as well as possible necrotizing fasciitis, a rare but serious bacterial infection to cause damage to the tissue under the skin in the area between and around the anus and genitals.\par Patient is up-to-date with his ophthalmologist.\par Patient is up-to-date with his podiatrist\par Foot exam is done today.  Notable for increased calluses bilaterally.\par Encourage patient to check glucose at least once daily, fasting and 2 hour post-prandially. \par \par 2.  Hypertension\par Patient states that he has no prior history of high blood pressure.\par BP goal <130/80\par Continue with Farxiga 10 mg once daily for renal protection.\par Check albumin/creatinine ratio annually.\par \par 3.  Cholesterol management\par LDL goal <70 mg/dL.\par Continue with statin therapy.\par Currently taking atorvastatin 20 mg once daily\par \par 4.  Hypothyroidism\par Patient is currently on levothyroxine 75 mcg once daily.\par We will check TFT today\par

## 2023-07-11 NOTE — PHYSICAL EXAM
[2+] : left 2+ [Ankle Swelling (On Exam)] : present [Ankle Swelling Bilaterally] : bilaterally  [Ankle Swelling On The Right] : mild [] : bilaterally [Skin Ulcer] : ulcer [Alert] : alert [Oriented to Person] : oriented to person [Oriented to Place] : oriented to place [Oriented to Time] : oriented to time [Calm] : calm [Please See PDF for [employer]] : Please See PDF for [employer]. [de-identified] : NAD obese [de-identified] : kwaku [de-identified] : supple [de-identified] : equal chest rise [de-identified] : bilateral lower extremities ulcer

## 2023-07-11 NOTE — PLAN
[FreeTextEntry1] : 03/20/2023\par Plan:\par Apply lidocaine or topical anesthetic if needed to reduce pain upon washing the wound.\par Wash wound with Dove skin sensitive soap and clean water when showering\par Wash wounds with Vashe and pat dry before dressing applied. \par new supplies sent in so pt. can have appropriate supplies\par Apply Adaptic/Xtrasorb dressing.\par Apply multi-layer compression bandage ACE bandage\par Change dressing every other day\par Written instructions for dressing change given to patient.\par Leg elevation as tolerated\par order lymphedema pump\par \par 4/24/23\par Plan:\par Apply Drawtex/Osbaldo/Circaids\par Change every other day\par Leg elevation as tolerated\par RTO in two weeks\par Continue using lymphedema pump twice daily\par Encouraged ambulation or exercise.\par Wound supplies ordered via DME\par Patient given contact information to DME\par \par 06/05/2023\par s/p excisional debridement\par Apply Drawtex/Osbaldo/Circaids\par Change every other day\par Leg elevation as tolerated\par RTO in two weeks\par Continue using lymphedema pump twice daily\par Encouraged ambulation or exercise.\par Wound supplies ordered via DME\par Patient given contact information to DME\par \par 6/12/23\par Plan:\par Apply Drawtex/Osbaldo/Circaids\par Change every other day\par Leg elevation as tolerated\par RTO in two weeks\par Continue using lymphedema pump twice daily\par Encouraged ambulation or exercise.\par \par 07/03/2023\par Apply Drawtex/Osbaldo/Circaids\par Change every other day\par Leg elevation as tolerated\par RTO in two weeks\par Continue using lymphedema pump twice daily\par Encouraged ambulation or exercise.\par \par 7/10/23\par Apply Drawtex/Osbaldo/Circaids\par Change every other day\par Leg elevation as tolerated\par RTO in one week\par Continue using lymphedema pump twice daily\par Encouraged ambulation or exercise.\par \par

## 2023-07-11 NOTE — ASSESSMENT
[FreeTextEntry1] : Wound Assessment and Plan:\par \par The patient presents with a wound to the bilateral lower extremities.  Swelling noted to the extremities.  \par Vascular procedures planned with Dr. Morrison in March.\par Bilateral palpable pulses. Moderate edema. Wound beds are red with large amount of yellow serous drainage.LLE wound with periwound erythema.\par Wounds with increased drainage and pain. Dressing changed to Adaptic and super absorber/Osbaldo/ACE wraps.\par s/p excisional debridement\par Compression garments ordered.\par \par \par 2/22/23\par bilateral leg wounds, right leg warm to touch, slight tenderness, has been using shilpi, telfa over wounds, incorrect supplies ordered, has not received wrap yet\par s/p excisional debridement\par He read the [facility] consent and signed it prior to the initiation of any screening procedures.  He had the opportunity to discuss any questions regarding the study.  I witnessed the signing of the consent and the patient was given a copy of the signed consent.\par \par \par 03/20/2023\par bilateral leg wounds, right leg warm to touch, slight tenderness, has been using shilpi, adaptic over wounds\par patient received circaid wraps\par s/p excisional debridement\par Patient underwent evaluation for peripheral arterial disease using a manual procedure  Ankle brachial index was obtained using blood pressure cuffs of the ankle and brachial segments of both legs.  The procedure was supervised and interpreted by the physician.  MAUDE of the right lower extremity 1.17.   MAUDE of the left lower extremity 1.1.  Waveform noted to be (triphasic).   Patient tolerated procedure well in the office.  Results discussed with the patient.\par Lower extremity wounds dressed with Adaptic/Drawtex/Osbaldo\par Patient educated by nurse in the proper use of Circaid compression garments\par Circaids placed on patient today\par Verified with company supplies to ship on March 23\par RTO to office in two weeks\par chronic lymphedema - has pump very old and not working\par \par 4/24/23\par Wound Assessment and Plan:\par The patient presents with a wounds to the bilateral lower extremities.  Minimal swelling noted to the extremities.  \par On exam:\par Wound beds with red granulation tissue, thin layer of yellow slough, no odor or erythema present, periwound skin dry and scaly\par No clinical sign of infection\par Recommendation:\par Apply lidocaine or topical anesthetic if needed to reduce pain upon washing the wound.\par Wash wound with Dove skin sensitive soap and clean water \par Apply Drawtex to the wound beds/Osbaldo/Circaids\par Change dressing --every other day\par Leg elevation as tolerated\par Encouraged ambulation or exercise.\par Optimization of nutrition.\par Continue using Lymphedema pump twice daily\par Wound supplies ordered via DME\par Patient given contact information to DME\par Follow up appointment scheduled for two weeks\par \par 06/05/2023\par Wound Assessment and Plan:\par The patient presents with a wounds to the bilateral lower extremities.  swelling noted to the extremities.  pt complaints of increased pain at night and when ambulating\par On exam:\par Wound beds with red granulation tissue, thin layer of yellow slough, no odor or erythema present, periwound skin dry and scaly\par No clinical sign of infection\par s/p excisional debridement\par Dynaflex will be applied today\par /\par The patient was positioned to allow for optimization of imaging. The fluorescence imaging device was placed 8-12 cm from the patient and the clinical darkness required for imaging was obtained by a dark drape. The wound measured () on the () . The [employer] i:X fluorescence imaging device was used to report presence and location of cyan fluorescence, indicative of bacteria at loads greater than 104 CFU/g in real-time. Images reported to have cyan fluorescence. The wound was mechanically cleansed with Dakins 0.125% and underwent excisional debridement. Fluorescence images acquired after cleansing demonstrated reduction in bacteria.\par \par Patient enrolled in affinity study, signed the consents\par \par 6/12/23\par The patient presents with a wounds to the bilateral lower extremities. Swelling noted to the extremities. Patient reports a decrease in pain and drainage.\par On exam:\par Wound beds with red granulation tissue, thin layer of yellow slough, no odor or erythema present, periwound skin dry and scaly\par No clinical sign of infection\par s/p excisional debridement\par Drawtex/Osbaldo/ACE wraps\par Will continue to use daily compression garments\par Lymphedema pump daily.\par The patient was positioned to allow for optimization of imaging. The fluorescence imaging device was placed 8-12 cm from the patient and the clinical darkness required for imaging was obtained by a dark drape. The wound measured see TA. The [employer] i:X fluorescence imaging device was used to report presence and location of cyan fluorescence, indicative of bacteria at loads greater than 104 CFU/g in real-time. Images reported to have cyan fluorescence. The wound was mechanically cleansed with Vashe and underwent excisional debridement. Fluorescence images acquired after cleansing demonstrated reduction in bacteria.\par \par Patient enrolled in affinity study, signed the consents\par \par  \par \par 07/03/2023\par The patient presents with a wounds to the bilateral lower extremities. Swelling noted to the extremities. Patient reports a decrease in  drainage.\par Today being randomized for Affinity study\par On exam:\par Wound beds with red granulation tissue, thin layer of yellow slough, no odor or erythema present, periwound skin dry and scaly\par No clinical sign of infection\par s/p excisional debridement\par Drawtex/Osbaldo/ACE wraps\par Will continue to use daily compression garments\par Lymphedema pump daily.\par \par 7/10/23\par The patient presents with a wounds to the bilateral lower extremities. Patient reports a decrease in drainage. Patient participating in the Affinity study.\par On exam:\par Wound beds with red granulation tissue, thin layer of yellow slough, no odor or erythema present, periwound skin \par No clinical sign of infection\par s/p excisional debridement\par Drawtex/Osbaldo/ACE wraps\par Will continue to use daily compression garments\par Lymphedema pump daily.\par \par \par \par

## 2023-07-14 DIAGNOSIS — L97.929 NON-PRESSURE CHRONIC ULCER OF UNSPECIFIED PART OF LEFT LOWER LEG WITH UNSPECIFIED SEVERITY: ICD-10-CM

## 2023-07-14 DIAGNOSIS — I87.2 VENOUS INSUFFICIENCY (CHRONIC) (PERIPHERAL): ICD-10-CM

## 2023-07-14 DIAGNOSIS — L97.919 NON-PRESSURE CHRONIC ULCER OF UNSPECIFIED PART OF RIGHT LOWER LEG WITH UNSPECIFIED SEVERITY: ICD-10-CM

## 2023-07-14 DIAGNOSIS — I87.333 CHRONIC VENOUS HYPERTENSION (IDIOPATHIC) WITH ULCER AND INFLAMMATION OF BILATERAL LOWER EXTREMITY: ICD-10-CM

## 2023-07-19 ENCOUNTER — OUTPATIENT (OUTPATIENT)
Dept: OUTPATIENT SERVICES | Facility: HOSPITAL | Age: 78
LOS: 1 days | End: 2023-07-19
Payer: COMMERCIAL

## 2023-07-19 ENCOUNTER — APPOINTMENT (OUTPATIENT)
Dept: WOUND CARE | Facility: CLINIC | Age: 78
End: 2023-07-19
Payer: COMMERCIAL

## 2023-07-19 VITALS — DIASTOLIC BLOOD PRESSURE: 66 MMHG | TEMPERATURE: 98.1 F | SYSTOLIC BLOOD PRESSURE: 169 MMHG | HEART RATE: 86 BPM

## 2023-07-19 PROCEDURE — 29581 APPL MULTLAYER CMPRN SYS LEG: CPT | Mod: RT

## 2023-07-19 NOTE — PHYSICAL EXAM
[2+] : left 2+ [Ankle Swelling (On Exam)] : present [Ankle Swelling Bilaterally] : bilaterally  [Ankle Swelling On The Right] : mild [] : bilaterally [Skin Ulcer] : ulcer [Alert] : alert [Oriented to Person] : oriented to person [Oriented to Place] : oriented to place [Oriented to Time] : oriented to time [Calm] : calm [Please See PDF for [employer]] : Please See PDF for [employer]. [de-identified] : NAD obese [de-identified] : kwaku [de-identified] : supple [de-identified] : equal chest rise [de-identified] : bilateral lower extremities ulcer

## 2023-07-19 NOTE — PLAN
[FreeTextEntry1] : 03/20/2023\par Plan:\par Apply lidocaine or topical anesthetic if needed to reduce pain upon washing the wound.\par Wash wound with Dove skin sensitive soap and clean water when showering\par Wash wounds with Vashe and pat dry before dressing applied. \par new supplies sent in so pt. can have appropriate supplies\par Apply Adaptic/Xtrasorb dressing.\par Apply multi-layer compression bandage ACE bandage\par Change dressing every other day\par Written instructions for dressing change given to patient.\par Leg elevation as tolerated\par order lymphedema pump\par \par 4/24/23\par Plan:\par Apply Drawtex/Osbaldo/Circaids\par Change every other day\par Leg elevation as tolerated\par RTO in two weeks\par Continue using lymphedema pump twice daily\par Encouraged ambulation or exercise.\par Wound supplies ordered via DME\par Patient given contact information to DME\par \par 06/05/2023\par s/p excisional debridement\par Apply Drawtex/Osbaldo/Circaids\par Change every other day\par Leg elevation as tolerated\par RTO in two weeks\par Continue using lymphedema pump twice daily\par Encouraged ambulation or exercise.\par Wound supplies ordered via DME\par Patient given contact information to DME\par \par 6/12/23\par Plan:\par Apply Drawtex/Osbaldo/Circaids\par Change every other day\par Leg elevation as tolerated\par RTO in two weeks\par Continue using lymphedema pump twice daily\par Encouraged ambulation or exercise.\par \par 07/03/2023\par Apply Drawtex/Osbaldo/Circaids\par Change every other day\par Leg elevation as tolerated\par RTO in two weeks\par Continue using lymphedema pump twice daily\par Encouraged ambulation or exercise.\par \par 7/10/23\par Apply Drawtex/Osbaldo/Circaids\par Change every other day\par Leg elevation as tolerated\par RTO in one week\par Continue using lymphedema pump twice daily\par Encouraged ambulation or exercise.\par \par 7/19/23\par Apply Aquacel/Osbaldo/Compression garments\par Change every other day\par Leg elevation as tolerated\par Continue Lymphedema pump twice daily\par Encourage exercise and ambulation\par RTO in one week\par

## 2023-07-19 NOTE — ASSESSMENT
[FreeTextEntry1] : Wound Assessment and Plan:\par \par The patient presents with a wound to the bilateral lower extremities.  Swelling noted to the extremities.  \par Vascular procedures planned with Dr. Morrison in March.\par Bilateral palpable pulses. Moderate edema. Wound beds are red with large amount of yellow serous drainage.LLE wound with periwound erythema.\par Wounds with increased drainage and pain. Dressing changed to Adaptic and super absorber/Osbaldo/ACE wraps.\par s/p excisional debridement\par Compression garments ordered.\par \par \par 2/22/23\par bilateral leg wounds, right leg warm to touch, slight tenderness, has been using shilpi, telfa over wounds, incorrect supplies ordered, has not received wrap yet\par s/p excisional debridement\par He read the [facility] consent and signed it prior to the initiation of any screening procedures.  He had the opportunity to discuss any questions regarding the study.  I witnessed the signing of the consent and the patient was given a copy of the signed consent.\par \par \par 03/20/2023\par bilateral leg wounds, right leg warm to touch, slight tenderness, has been using shilpi, adaptic over wounds\par patient received circaid wraps\par s/p excisional debridement\par Patient underwent evaluation for peripheral arterial disease using a manual procedure  Ankle brachial index was obtained using blood pressure cuffs of the ankle and brachial segments of both legs.  The procedure was supervised and interpreted by the physician.  MAUDE of the right lower extremity 1.17.   MAUDE of the left lower extremity 1.1.  Waveform noted to be (triphasic).   Patient tolerated procedure well in the office.  Results discussed with the patient.\par Lower extremity wounds dressed with Adaptic/Drawtex/Osbaldo\par Patient educated by nurse in the proper use of Circaid compression garments\par Circaids placed on patient today\par Verified with company supplies to ship on March 23\par RTO to office in two weeks\par chronic lymphedema - has pump very old and not working\par \par 4/24/23\par Wound Assessment and Plan:\par The patient presents with a wounds to the bilateral lower extremities.  Minimal swelling noted to the extremities.  \par On exam:\par Wound beds with red granulation tissue, thin layer of yellow slough, no odor or erythema present, periwound skin dry and scaly\par No clinical sign of infection\par Recommendation:\par Apply lidocaine or topical anesthetic if needed to reduce pain upon washing the wound.\par Wash wound with Dove skin sensitive soap and clean water \par Apply Drawtex to the wound beds/Osbaldo/Circaids\par Change dressing --every other day\par Leg elevation as tolerated\par Encouraged ambulation or exercise.\par Optimization of nutrition.\par Continue using Lymphedema pump twice daily\par Wound supplies ordered via DME\par Patient given contact information to DME\par Follow up appointment scheduled for two weeks\par \par 06/05/2023\par Wound Assessment and Plan:\par The patient presents with a wounds to the bilateral lower extremities.  swelling noted to the extremities.  pt complaints of increased pain at night and when ambulating\par On exam:\par Wound beds with red granulation tissue, thin layer of yellow slough, no odor or erythema present, periwound skin dry and scaly\par No clinical sign of infection\par s/p excisional debridement\par Dynaflex will be applied today\par /\par The patient was positioned to allow for optimization of imaging. The fluorescence imaging device was placed 8-12 cm from the patient and the clinical darkness required for imaging was obtained by a dark drape. The wound measured () on the () . The [employer] i:X fluorescence imaging device was used to report presence and location of cyan fluorescence, indicative of bacteria at loads greater than 104 CFU/g in real-time. Images reported to have cyan fluorescence. The wound was mechanically cleansed with Dakins 0.125% and underwent excisional debridement. Fluorescence images acquired after cleansing demonstrated reduction in bacteria.\par \par Patient enrolled in affinity study, signed the consents\par \par 6/12/23\par The patient presents with a wounds to the bilateral lower extremities. Swelling noted to the extremities. Patient reports a decrease in pain and drainage.\par On exam:\par Wound beds with red granulation tissue, thin layer of yellow slough, no odor or erythema present, periwound skin dry and scaly\par No clinical sign of infection\par s/p excisional debridement\par Drawtex/Osbaldo/ACE wraps\par Will continue to use daily compression garments\par Lymphedema pump daily.\par The patient was positioned to allow for optimization of imaging. The fluorescence imaging device was placed 8-12 cm from the patient and the clinical darkness required for imaging was obtained by a dark drape. The wound measured see TA. The [employer] i:X fluorescence imaging device was used to report presence and location of cyan fluorescence, indicative of bacteria at loads greater than 104 CFU/g in real-time. Images reported to have cyan fluorescence. The wound was mechanically cleansed with Vashe and underwent excisional debridement. Fluorescence images acquired after cleansing demonstrated reduction in bacteria.\par \par Patient enrolled in affinity study, signed the consents\par \par  \par \par 07/03/2023\par The patient presents with a wounds to the bilateral lower extremities. Swelling noted to the extremities. Patient reports a decrease in  drainage.\par Today being randomized for Affinity study\par On exam:\par Wound beds with red granulation tissue, thin layer of yellow slough, no odor or erythema present, periwound skin dry and scaly\par No clinical sign of infection\par s/p excisional debridement\par Drawtex/Osbaldo/ACE wraps\par Will continue to use daily compression garments\par Lymphedema pump daily.\par \par 7/10/23\par The patient presents with a wounds to the bilateral lower extremities. Patient reports a decrease in drainage. Patient participating in the Affinity study.\par On exam:\par Wound beds with red granulation tissue, thin layer of yellow slough, no odor or erythema present, periwound skin \par No clinical sign of infection\par s/p excisional debridement\par Drawtex/Osbaldo/ACE wraps\par Will continue to use daily compression garments\par Lymphedema pump daily.\par \par 7/19/23\par The patient presents with a wounds to the bilateral lower extremities. Patient reports a decrease in drainage. \par On exam:\par Wounds improving beds with red granulation tissue, no odor or erythema present, periwound skin with hyperpigmented\par No clinical sign of infection\par s/p mechanical debridement\par Aquacel/Osbaldo/Compression garments\par Continue Lymphedema pump daily.\par \par \par \par \par

## 2023-07-24 ENCOUNTER — APPOINTMENT (OUTPATIENT)
Dept: WOUND CARE | Facility: CLINIC | Age: 78
End: 2023-07-24
Payer: COMMERCIAL

## 2023-07-24 VITALS
HEART RATE: 49 BPM | RESPIRATION RATE: 16 BRPM | TEMPERATURE: 97.5 F | DIASTOLIC BLOOD PRESSURE: 59 MMHG | OXYGEN SATURATION: 97 % | SYSTOLIC BLOOD PRESSURE: 153 MMHG

## 2023-07-24 PROCEDURE — 11042 DBRDMT SUBQ TIS 1ST 20SQCM/<: CPT

## 2023-07-25 ENCOUNTER — APPOINTMENT (OUTPATIENT)
Dept: CARDIOLOGY | Facility: CLINIC | Age: 78
End: 2023-07-25

## 2023-07-25 DIAGNOSIS — L97.919 NON-PRESSURE CHRONIC ULCER OF UNSPECIFIED PART OF RIGHT LOWER LEG WITH UNSPECIFIED SEVERITY: ICD-10-CM

## 2023-07-25 DIAGNOSIS — I87.333 CHRONIC VENOUS HYPERTENSION (IDIOPATHIC) WITH ULCER AND INFLAMMATION OF BILATERAL LOWER EXTREMITY: ICD-10-CM

## 2023-07-25 DIAGNOSIS — L97.929 NON-PRESSURE CHRONIC ULCER OF UNSPECIFIED PART OF LEFT LOWER LEG WITH UNSPECIFIED SEVERITY: ICD-10-CM

## 2023-07-25 NOTE — ASSESSMENT
[FreeTextEntry1] : Wound Assessment and Plan:\par \par The patient presents with a wound to the bilateral lower extremities.  Swelling noted to the extremities.  \par Vascular procedures planned with Dr. Morrison in March.\par Bilateral palpable pulses. Moderate edema. Wound beds are red with large amount of yellow serous drainage.LLE wound with periwound erythema.\par Wounds with increased drainage and pain. Dressing changed to Adaptic and super absorber/Osbaldo/ACE wraps.\par s/p excisional debridement\par Compression garments ordered.\par \par \par 2/22/23\par bilateral leg wounds, right leg warm to touch, slight tenderness, has been using shilpi, telfa over wounds, incorrect supplies ordered, has not received wrap yet\par s/p excisional debridement\par He read the [facility] consent and signed it prior to the initiation of any screening procedures.  He had the opportunity to discuss any questions regarding the study.  I witnessed the signing of the consent and the patient was given a copy of the signed consent.\par \par \par 03/20/2023\par bilateral leg wounds, right leg warm to touch, slight tenderness, has been using shilpi, adaptic over wounds\par patient received circaid wraps\par s/p excisional debridement\par Patient underwent evaluation for peripheral arterial disease using a manual procedure  Ankle brachial index was obtained using blood pressure cuffs of the ankle and brachial segments of both legs.  The procedure was supervised and interpreted by the physician.  MAUDE of the right lower extremity 1.17.   MAUDE of the left lower extremity 1.1.  Waveform noted to be (triphasic).   Patient tolerated procedure well in the office.  Results discussed with the patient.\par Lower extremity wounds dressed with Adaptic/Drawtex/Osbaldo\par Patient educated by nurse in the proper use of Circaid compression garments\par Circaids placed on patient today\par Verified with company supplies to ship on March 23\par RTO to office in two weeks\par chronic lymphedema - has pump very old and not working\par \par 4/24/23\par Wound Assessment and Plan:\par The patient presents with a wounds to the bilateral lower extremities.  Minimal swelling noted to the extremities.  \par On exam:\par Wound beds with red granulation tissue, thin layer of yellow slough, no odor or erythema present, periwound skin dry and scaly\par No clinical sign of infection\par Recommendation:\par Apply lidocaine or topical anesthetic if needed to reduce pain upon washing the wound.\par Wash wound with Dove skin sensitive soap and clean water \par Apply Drawtex to the wound beds/Osbaldo/Circaids\par Change dressing --every other day\par Leg elevation as tolerated\par Encouraged ambulation or exercise.\par Optimization of nutrition.\par Continue using Lymphedema pump twice daily\par Wound supplies ordered via DME\par Patient given contact information to DME\par Follow up appointment scheduled for two weeks\par \par 06/05/2023\par Wound Assessment and Plan:\par The patient presents with a wounds to the bilateral lower extremities.  swelling noted to the extremities.  pt complaints of increased pain at night and when ambulating\par On exam:\par Wound beds with red granulation tissue, thin layer of yellow slough, no odor or erythema present, periwound skin dry and scaly\par No clinical sign of infection\par s/p excisional debridement\par Dynaflex will be applied today\par /\par The patient was positioned to allow for optimization of imaging. The fluorescence imaging device was placed 8-12 cm from the patient and the clinical darkness required for imaging was obtained by a dark drape. The wound measured () on the () . The [employer] i:X fluorescence imaging device was used to report presence and location of cyan fluorescence, indicative of bacteria at loads greater than 104 CFU/g in real-time. Images reported to have cyan fluorescence. The wound was mechanically cleansed with Dakins 0.125% and underwent excisional debridement. Fluorescence images acquired after cleansing demonstrated reduction in bacteria.\par \par Patient enrolled in affinity study, signed the consents\par \par 6/12/23\par The patient presents with a wounds to the bilateral lower extremities. Swelling noted to the extremities. Patient reports a decrease in pain and drainage.\par On exam:\par Wound beds with red granulation tissue, thin layer of yellow slough, no odor or erythema present, periwound skin dry and scaly\par No clinical sign of infection\par s/p excisional debridement\par Drawtex/Osbaldo/ACE wraps\par Will continue to use daily compression garments\par Lymphedema pump daily.\par The patient was positioned to allow for optimization of imaging. The fluorescence imaging device was placed 8-12 cm from the patient and the clinical darkness required for imaging was obtained by a dark drape. The wound measured see TA. The [employer] i:X fluorescence imaging device was used to report presence and location of cyan fluorescence, indicative of bacteria at loads greater than 104 CFU/g in real-time. Images reported to have cyan fluorescence. The wound was mechanically cleansed with Vashe and underwent excisional debridement. Fluorescence images acquired after cleansing demonstrated reduction in bacteria.\par \par Patient enrolled in affinity study, signed the consents\par \par  \par \par 07/03/2023\par The patient presents with a wounds to the bilateral lower extremities. Swelling noted to the extremities. Patient reports a decrease in  drainage.\par Today being randomized for Affinity study\par On exam:\par Wound beds with red granulation tissue, thin layer of yellow slough, no odor or erythema present, periwound skin dry and scaly\par No clinical sign of infection\par s/p excisional debridement\par Drawtex/Osbaldo/ACE wraps\par Will continue to use daily compression garments\par Lymphedema pump daily.\par \par 7/10/23\par The patient presents with a wounds to the bilateral lower extremities. Patient reports a decrease in drainage. Patient participating in the Affinity study.\par On exam:\par Wound beds with red granulation tissue, thin layer of yellow slough, no odor or erythema present, periwound skin \par No clinical sign of infection\par s/p excisional debridement\par Drawtex/Osbaldo/ACE wraps\par Will continue to use daily compression garments\par Lymphedema pump daily.\par \par 7/19/23\par The patient presents with a wounds to the bilateral lower extremities. Patient reports a decrease in drainage. \par On exam:\par Wounds improving beds with red granulation tissue, no odor or erythema present, periwound skin with hyperpigmented\par No clinical sign of infection\par s/p mechanical debridement\par Aquacel/Osbaldo/Compression garments\par Continue Lymphedema pump daily.\par \par 07/24/2023\par The patient presents with a follow up on  wounds to the bilateral lower extremities. Patient reports a decrease in drainage. \par On exam:\par Left leg ulcer healed\par Right leg wound improving beds with red granulation tissue, no odor or erythema present, periwound skin with hyperpigmented\par No clinical sign of infection\par s/p excisional debridement\par Aquacel/Osbaldo/Compression garments\par Continue Lymphedema pump daily.\par \par \par

## 2023-07-25 NOTE — PHYSICAL EXAM
[2+] : left 2+ [Ankle Swelling (On Exam)] : present [Ankle Swelling Bilaterally] : bilaterally  [Ankle Swelling On The Right] : mild [] : bilaterally [Skin Ulcer] : ulcer [Alert] : alert [Oriented to Person] : oriented to person [Oriented to Place] : oriented to place [Oriented to Time] : oriented to time [Calm] : calm [Please See PDF for [employer]] : Please See PDF for [employer]. [de-identified] : NAD obese [de-identified] : kwaku [de-identified] : supple [de-identified] : equal chest rise [de-identified] : bilateral lower extremities ulcer

## 2023-07-25 NOTE — PLAN
[FreeTextEntry1] : 03/20/2023\par Plan:\par Apply lidocaine or topical anesthetic if needed to reduce pain upon washing the wound.\par Wash wound with Dove skin sensitive soap and clean water when showering\par Wash wounds with Vashe and pat dry before dressing applied. \par new supplies sent in so pt. can have appropriate supplies\par Apply Adaptic/Xtrasorb dressing.\par Apply multi-layer compression bandage ACE bandage\par Change dressing every other day\par Written instructions for dressing change given to patient.\par Leg elevation as tolerated\par order lymphedema pump\par \par 4/24/23\par Plan:\par Apply Drawtex/Osbaldo/Circaids\par Change every other day\par Leg elevation as tolerated\par RTO in two weeks\par Continue using lymphedema pump twice daily\par Encouraged ambulation or exercise.\par Wound supplies ordered via DME\par Patient given contact information to DME\par \par 06/05/2023\par s/p excisional debridement\par Apply Drawtex/Osbaldo/Circaids\par Change every other day\par Leg elevation as tolerated\par RTO in two weeks\par Continue using lymphedema pump twice daily\par Encouraged ambulation or exercise.\par Wound supplies ordered via DME\par Patient given contact information to DME\par \par 6/12/23\par Plan:\par Apply Drawtex/Osbaldo/Circaids\par Change every other day\par Leg elevation as tolerated\par RTO in two weeks\par Continue using lymphedema pump twice daily\par Encouraged ambulation or exercise.\par \par 07/03/2023\par Apply Drawtex/Osbaldo/Circaids\par Change every other day\par Leg elevation as tolerated\par RTO in two weeks\par Continue using lymphedema pump twice daily\par Encouraged ambulation or exercise.\par \par 7/10/23\par Apply Drawtex/Osbaldo/Circaids\par Change every other day\par Leg elevation as tolerated\par RTO in one week\par Continue using lymphedema pump twice daily\par Encouraged ambulation or exercise.\par \par 7/19/23\par Apply Aquacel/Osbaldo/Compression garments\par Change every other day\par Leg elevation as tolerated\par Continue Lymphedema pump twice daily\par Encourage exercise and ambulation\par RTO in one week\par \par \par 07/24/2023\par Apply Aquacel/Osbaldo/Compression garments\par Change every other day\par Leg elevation as tolerated\par Continue Lymphedema pump twice daily\par Encourage exercise and ambulation\par RTO in one week

## 2023-07-26 ENCOUNTER — OUTPATIENT (OUTPATIENT)
Dept: OUTPATIENT SERVICES | Facility: HOSPITAL | Age: 78
LOS: 1 days | End: 2023-07-26

## 2023-07-26 VITALS
OXYGEN SATURATION: 98 % | HEART RATE: 45 BPM | DIASTOLIC BLOOD PRESSURE: 74 MMHG | TEMPERATURE: 98 F | WEIGHT: 199.96 LBS | HEIGHT: 67.5 IN | RESPIRATION RATE: 16 BRPM | SYSTOLIC BLOOD PRESSURE: 168 MMHG

## 2023-07-26 DIAGNOSIS — Z87.438 PERSONAL HISTORY OF OTHER DISEASES OF MALE GENITAL ORGANS: ICD-10-CM

## 2023-07-26 DIAGNOSIS — I48.91 UNSPECIFIED ATRIAL FIBRILLATION: ICD-10-CM

## 2023-07-26 DIAGNOSIS — I87.333 CHRONIC VENOUS HYPERTENSION (IDIOPATHIC) WITH ULCER AND INFLAMMATION OF BILATERAL LOWER EXTREMITY: ICD-10-CM

## 2023-07-26 DIAGNOSIS — L97.929 NON-PRESSURE CHRONIC ULCER OF UNSPECIFIED PART OF LEFT LOWER LEG WITH UNSPECIFIED SEVERITY: ICD-10-CM

## 2023-07-26 DIAGNOSIS — E11.9 TYPE 2 DIABETES MELLITUS WITHOUT COMPLICATIONS: ICD-10-CM

## 2023-07-26 DIAGNOSIS — L97.919 NON-PRESSURE CHRONIC ULCER OF UNSPECIFIED PART OF RIGHT LOWER LEG WITH UNSPECIFIED SEVERITY: ICD-10-CM

## 2023-07-26 DIAGNOSIS — Z98.890 OTHER SPECIFIED POSTPROCEDURAL STATES: Chronic | ICD-10-CM

## 2023-07-26 DIAGNOSIS — I10 ESSENTIAL (PRIMARY) HYPERTENSION: ICD-10-CM

## 2023-07-26 LAB
A1C WITH ESTIMATED AVERAGE GLUCOSE RESULT: 6.4 % — HIGH (ref 4–5.6)
ALBUMIN SERPL ELPH-MCNC: 4.1 G/DL — SIGNIFICANT CHANGE UP (ref 3.3–5)
ALP SERPL-CCNC: 93 U/L — SIGNIFICANT CHANGE UP (ref 40–120)
ALT FLD-CCNC: 9 U/L — SIGNIFICANT CHANGE UP (ref 4–41)
ANION GAP SERPL CALC-SCNC: 12 MMOL/L — SIGNIFICANT CHANGE UP (ref 7–14)
AST SERPL-CCNC: 16 U/L — SIGNIFICANT CHANGE UP (ref 4–40)
BILIRUB SERPL-MCNC: 0.3 MG/DL — SIGNIFICANT CHANGE UP (ref 0.2–1.2)
BLD GP AB SCN SERPL QL: NEGATIVE — SIGNIFICANT CHANGE UP
BUN SERPL-MCNC: 27 MG/DL — HIGH (ref 7–23)
CALCIUM SERPL-MCNC: 9.6 MG/DL — SIGNIFICANT CHANGE UP (ref 8.4–10.5)
CHLORIDE SERPL-SCNC: 103 MMOL/L — SIGNIFICANT CHANGE UP (ref 98–107)
CO2 SERPL-SCNC: 23 MMOL/L — SIGNIFICANT CHANGE UP (ref 22–31)
CREAT SERPL-MCNC: 1.73 MG/DL — HIGH (ref 0.5–1.3)
EGFR: 40 ML/MIN/1.73M2 — LOW
ESTIMATED AVERAGE GLUCOSE: 137 — SIGNIFICANT CHANGE UP
GLUCOSE SERPL-MCNC: 90 MG/DL — SIGNIFICANT CHANGE UP (ref 70–99)
HCT VFR BLD CALC: 37.3 % — LOW (ref 39–50)
HGB BLD-MCNC: 11.2 G/DL — LOW (ref 13–17)
MCHC RBC-ENTMCNC: 25 PG — LOW (ref 27–34)
MCHC RBC-ENTMCNC: 30 GM/DL — LOW (ref 32–36)
MCV RBC AUTO: 83.3 FL — SIGNIFICANT CHANGE UP (ref 80–100)
NRBC # BLD: 0 /100 WBCS — SIGNIFICANT CHANGE UP (ref 0–0)
NRBC # FLD: 0 K/UL — SIGNIFICANT CHANGE UP (ref 0–0)
PLATELET # BLD AUTO: 257 K/UL — SIGNIFICANT CHANGE UP (ref 150–400)
POTASSIUM SERPL-MCNC: 5.6 MMOL/L — HIGH (ref 3.5–5.3)
POTASSIUM SERPL-SCNC: 5.6 MMOL/L — HIGH (ref 3.5–5.3)
PROT SERPL-MCNC: 9.4 G/DL — HIGH (ref 6–8.3)
RBC # BLD: 4.48 M/UL — SIGNIFICANT CHANGE UP (ref 4.2–5.8)
RBC # FLD: 17.2 % — HIGH (ref 10.3–14.5)
RH IG SCN BLD-IMP: POSITIVE — SIGNIFICANT CHANGE UP
SODIUM SERPL-SCNC: 138 MMOL/L — SIGNIFICANT CHANGE UP (ref 135–145)
WBC # BLD: 6.24 K/UL — SIGNIFICANT CHANGE UP (ref 3.8–10.5)
WBC # FLD AUTO: 6.24 K/UL — SIGNIFICANT CHANGE UP (ref 3.8–10.5)

## 2023-07-26 NOTE — H&P PST ADULT - CONSTITUTIONAL
Pt says had a Dx of ADHD in the past. She is been off medication for about a year because wanted to try off meds. But says with medication works better, even though she things Vivensse was not great because of the side effects.   No records about Dx. Discussed with her about the possibility that [facility] would not feel this medication for her even with a Dx. She wants to get records fax to the office.   well-groomed/obese

## 2023-07-26 NOTE — H&P PST ADULT - PROBLEM SELECTOR PROBLEM 1
It was a pleasure speaking with you during our recent video visit.  You do not have any new cardiac symptoms.  As we discussed, your echocardiogram shows your heart function is normal, your aortic stenosis is now entering the severe range.    The only treatment for aortic stenosis is eventual aortic valve replacement.  I would recommend doing this in the next 6 to 12 months, as the valve will only continue to slowly get worse.  There are 2 options for replacing the valve.  The first is a transcatheter aortic valve replacement (TAVR), where a catheter is inserted in the artery in the leg and a new valve is delivered through blood vessel.  The second option is open heart surgery.  I work with a team of people at [facility] who do a comprehensive evaluation and determine which option is best for each patient.    Some of your heart findings including the aortic stenosis, atrial flutter, along with your lumbar spinal stenosis, can go along with a blood disorder called amyloidosis.  This is a disorder where your body produces too much of a protein, either coming from your bone marrow or your liver.  This protein can deposit in a variety of tissues including the heart.  I would like to order a nuclear scan (Technitium PYP scan) that is done at [facility], this tests for the deposit of such protein in the heart.  There are some newer drugs that have been approved for treating this which can slow the progression if this disorder was found.    Our schedulers will contact you to arrange these appointments.  Please call the cardiology clinic sooner with any questions or concerns.  
A-fib

## 2023-07-26 NOTE — H&P PST ADULT - NSICDXPASTMEDICALHX_GEN_ALL_CORE_FT
PAST MEDICAL HISTORY:  AF (atrial fibrillation) Eliquis    HTN (hypertension)     PVD (peripheral vascular disease)      PAST MEDICAL HISTORY:  AF (atrial fibrillation) Eliquis    DM (diabetes mellitus)     History of BPH     HLD (hyperlipidemia)     HTN (hypertension)     Obesity     PVD (peripheral vascular disease)

## 2023-07-26 NOTE — H&P PST ADULT - HISTORY OF PRESENT ILLNESS
Pt is a 78 yr old male scheduled for AVEIR by Wong Leadless PPM with Dr Saravia tentatively 8/2/23  Pt is a 78 yr old male scheduled for AVEIR by Wong Leadless PPM with Dr Saravia tentatively 8/2/23 - pt hx a-fib with bradycardia and occasional dizziness with increased ROSENBERG - pt now for pacemaker - hx PVD with healed stasis ulcer lower legs B/L - hx obesity, DM, HTN, HLD, BPH - on Eliquis , Farxiga

## 2023-07-26 NOTE — H&P PST ADULT - LIVES WITH, PROFILE
Jayy Gonsales presents to Urgent Care Patient arriving with: alone with complaint of fever, chills, x 3 days, now has head congestion and a cough x 2 days.      Patient and visitor wearing mask? Yes    Myself mask    Can leave detailed message on   home phone:  [phone] (home) or mobile phone:    Mobile [phone]        spouse

## 2023-07-26 NOTE — H&P PST ADULT - PROBLEM SELECTOR PLAN 1
Pt scheduled for surgery and preop instructions including instructions for taking Famotidine and for Chlorhexidine use in showering on the day of surgery, given verbally and with use of  written materials, and patient confirming understanding of such instructions using  teach back method.  Pt confirms getting EP instructions - reviewed with patient and wife

## 2023-08-02 ENCOUNTER — APPOINTMENT (OUTPATIENT)
Dept: WOUND CARE | Facility: CLINIC | Age: 78
End: 2023-08-02
Payer: COMMERCIAL

## 2023-08-02 ENCOUNTER — OUTPATIENT (OUTPATIENT)
Dept: OUTPATIENT SERVICES | Facility: HOSPITAL | Age: 78
LOS: 1 days | End: 2023-08-02
Payer: COMMERCIAL

## 2023-08-02 ENCOUNTER — INPATIENT (INPATIENT)
Facility: HOSPITAL | Age: 78
LOS: 0 days | Discharge: HOME | End: 2023-08-03
Attending: INTERNAL MEDICINE | Admitting: INTERNAL MEDICINE
Payer: COMMERCIAL

## 2023-08-02 ENCOUNTER — RESULT REVIEW (OUTPATIENT)
Age: 78
End: 2023-08-02

## 2023-08-02 VITALS
BODY MASS INDEX: 29.06 KG/M2 | OXYGEN SATURATION: 98 % | DIASTOLIC BLOOD PRESSURE: 55 MMHG | WEIGHT: 203 LBS | HEART RATE: 39 BPM | TEMPERATURE: 97.7 F | RESPIRATION RATE: 15 BRPM | SYSTOLIC BLOOD PRESSURE: 147 MMHG | HEIGHT: 70 IN

## 2023-08-02 VITALS
HEART RATE: 63 BPM | OXYGEN SATURATION: 96 % | HEIGHT: 68 IN | SYSTOLIC BLOOD PRESSURE: 128 MMHG | RESPIRATION RATE: 19 BRPM | WEIGHT: 201.06 LBS | TEMPERATURE: 98 F | DIASTOLIC BLOOD PRESSURE: 69 MMHG

## 2023-08-02 DIAGNOSIS — Z98.890 OTHER SPECIFIED POSTPROCEDURAL STATES: Chronic | ICD-10-CM

## 2023-08-02 DIAGNOSIS — I48.91 UNSPECIFIED ATRIAL FIBRILLATION: ICD-10-CM

## 2023-08-02 LAB
BASE EXCESS BLDV CALC-SCNC: -0.5 MMOL/L — SIGNIFICANT CHANGE UP (ref -2–3)
CA-I SERPL-SCNC: 1.25 MMOL/L — SIGNIFICANT CHANGE UP (ref 1.15–1.33)
CO2 BLDV-SCNC: 26.8 MMOL/L — HIGH (ref 22–26)
GAS PNL BLDV: 135 MMOL/L — LOW (ref 136–145)
GLUCOSE BLDC GLUCOMTR-MCNC: 132 MG/DL — HIGH (ref 70–99)
GLUCOSE BLDC GLUCOMTR-MCNC: 156 MG/DL — HIGH (ref 70–99)
GLUCOSE BLDC GLUCOMTR-MCNC: 85 MG/DL — SIGNIFICANT CHANGE UP (ref 70–99)
GLUCOSE BLDC GLUCOMTR-MCNC: 94 MG/DL — SIGNIFICANT CHANGE UP (ref 70–99)
GLUCOSE BLDV-MCNC: 105 MG/DL — HIGH (ref 70–99)
HCO3 BLDV-SCNC: 25 MMOL/L — SIGNIFICANT CHANGE UP (ref 22–29)
HCT VFR BLDA CALC: 35 % — SIGNIFICANT CHANGE UP
HGB BLD CALC-MCNC: 11.7 G/DL — LOW (ref 12.6–17.4)
LACTATE BLDV-MCNC: 1.4 MMOL/L — SIGNIFICANT CHANGE UP (ref 0.5–2)
PCO2 BLDV: 46 MMHG — SIGNIFICANT CHANGE UP (ref 42–55)
PH BLDV: 7.35 — SIGNIFICANT CHANGE UP (ref 7.32–7.43)
PO2 BLDV: <33 MMHG — SIGNIFICANT CHANGE UP (ref 25–45)
POTASSIUM BLDV-SCNC: 4.9 MMOL/L — SIGNIFICANT CHANGE UP (ref 3.5–5.1)
RH IG SCN BLD-IMP: POSITIVE — SIGNIFICANT CHANGE UP
SAO2 % BLDV: 26 % — LOW (ref 67–88)

## 2023-08-02 PROCEDURE — 33274 TCAT INSJ/RPL PERM LDLS PM: CPT

## 2023-08-02 PROCEDURE — 93010 ELECTROCARDIOGRAM REPORT: CPT | Mod: 77

## 2023-08-02 PROCEDURE — 11042 DBRDMT SUBQ TIS 1ST 20SQCM/<: CPT

## 2023-08-02 PROCEDURE — 93010 ELECTROCARDIOGRAM REPORT: CPT

## 2023-08-02 PROCEDURE — 71045 X-RAY EXAM CHEST 1 VIEW: CPT | Mod: 26

## 2023-08-02 RX ORDER — LEVOTHYROXINE SODIUM 125 MCG
1 TABLET ORAL
Refills: 0 | DISCHARGE

## 2023-08-02 RX ORDER — GABAPENTIN 400 MG/1
100 CAPSULE ORAL DAILY
Refills: 0 | Status: DISCONTINUED | OUTPATIENT
Start: 2023-08-02 | End: 2023-08-03

## 2023-08-02 RX ORDER — SODIUM CHLORIDE 9 MG/ML
1000 INJECTION, SOLUTION INTRAVENOUS
Refills: 0 | Status: DISCONTINUED | OUTPATIENT
Start: 2023-08-02 | End: 2023-08-03

## 2023-08-02 RX ORDER — DEXTROSE 50 % IN WATER 50 %
15 SYRINGE (ML) INTRAVENOUS ONCE
Refills: 0 | Status: DISCONTINUED | OUTPATIENT
Start: 2023-08-02 | End: 2023-08-03

## 2023-08-02 RX ORDER — GLUCAGON INJECTION, SOLUTION 0.5 MG/.1ML
1 INJECTION, SOLUTION SUBCUTANEOUS ONCE
Refills: 0 | Status: DISCONTINUED | OUTPATIENT
Start: 2023-08-02 | End: 2023-08-03

## 2023-08-02 RX ORDER — DEXTROSE 50 % IN WATER 50 %
25 SYRINGE (ML) INTRAVENOUS ONCE
Refills: 0 | Status: DISCONTINUED | OUTPATIENT
Start: 2023-08-02 | End: 2023-08-03

## 2023-08-02 RX ORDER — SODIUM CHLORIDE 9 MG/ML
3 INJECTION INTRAMUSCULAR; INTRAVENOUS; SUBCUTANEOUS EVERY 8 HOURS
Refills: 0 | Status: DISCONTINUED | OUTPATIENT
Start: 2023-08-02 | End: 2023-08-03

## 2023-08-02 RX ORDER — LEVOTHYROXINE SODIUM 125 MCG
88 TABLET ORAL DAILY
Refills: 0 | Status: DISCONTINUED | OUTPATIENT
Start: 2023-08-02 | End: 2023-08-03

## 2023-08-02 RX ORDER — ATORVASTATIN CALCIUM 80 MG/1
20 TABLET, FILM COATED ORAL AT BEDTIME
Refills: 0 | Status: DISCONTINUED | OUTPATIENT
Start: 2023-08-02 | End: 2023-08-03

## 2023-08-02 RX ORDER — METHOCARBAMOL 500 MG/1
1 TABLET, FILM COATED ORAL
Refills: 0 | DISCHARGE

## 2023-08-02 RX ORDER — INSULIN LISPRO 100/ML
VIAL (ML) SUBCUTANEOUS AT BEDTIME
Refills: 0 | Status: DISCONTINUED | OUTPATIENT
Start: 2023-08-02 | End: 2023-08-03

## 2023-08-02 RX ORDER — OLMESARTAN MEDOXOMIL 5 MG/1
1 TABLET, FILM COATED ORAL
Refills: 0 | DISCHARGE

## 2023-08-02 RX ORDER — INSULIN LISPRO 100/ML
VIAL (ML) SUBCUTANEOUS
Refills: 0 | Status: DISCONTINUED | OUTPATIENT
Start: 2023-08-02 | End: 2023-08-03

## 2023-08-02 RX ORDER — METFORMIN HYDROCHLORIDE 850 MG/1
1 TABLET ORAL
Refills: 0 | DISCHARGE

## 2023-08-02 RX ORDER — ATORVASTATIN CALCIUM 80 MG/1
1 TABLET, FILM COATED ORAL
Refills: 0 | DISCHARGE

## 2023-08-02 RX ORDER — DEXTROSE 50 % IN WATER 50 %
12.5 SYRINGE (ML) INTRAVENOUS ONCE
Refills: 0 | Status: DISCONTINUED | OUTPATIENT
Start: 2023-08-02 | End: 2023-08-03

## 2023-08-02 RX ORDER — DAPAGLIFLOZIN 10 MG/1
1 TABLET, FILM COATED ORAL
Refills: 0 | DISCHARGE

## 2023-08-02 RX ORDER — TAMSULOSIN HYDROCHLORIDE 0.4 MG/1
0.4 CAPSULE ORAL AT BEDTIME
Refills: 0 | Status: DISCONTINUED | OUTPATIENT
Start: 2023-08-02 | End: 2023-08-03

## 2023-08-02 RX ORDER — TAMSULOSIN HYDROCHLORIDE 0.4 MG/1
1 CAPSULE ORAL
Refills: 0 | DISCHARGE

## 2023-08-02 RX ORDER — LOSARTAN POTASSIUM 100 MG/1
50 TABLET, FILM COATED ORAL DAILY
Refills: 0 | Status: DISCONTINUED | OUTPATIENT
Start: 2023-08-02 | End: 2023-08-03

## 2023-08-02 RX ORDER — GABAPENTIN 400 MG/1
1 CAPSULE ORAL
Refills: 0 | DISCHARGE

## 2023-08-02 RX ORDER — METHOCARBAMOL 500 MG/1
500 TABLET, FILM COATED ORAL DAILY
Refills: 0 | Status: DISCONTINUED | OUTPATIENT
Start: 2023-08-02 | End: 2023-08-03

## 2023-08-02 RX ORDER — AMLODIPINE BESYLATE 2.5 MG/1
5 TABLET ORAL DAILY
Refills: 0 | Status: DISCONTINUED | OUTPATIENT
Start: 2023-08-02 | End: 2023-08-03

## 2023-08-02 RX ORDER — AMLODIPINE BESYLATE 2.5 MG/1
1 TABLET ORAL
Refills: 0 | DISCHARGE

## 2023-08-02 RX ADMIN — ATORVASTATIN CALCIUM 20 MILLIGRAM(S): 80 TABLET, FILM COATED ORAL at 21:38

## 2023-08-02 RX ADMIN — SODIUM CHLORIDE 3 MILLILITER(S): 9 INJECTION INTRAMUSCULAR; INTRAVENOUS; SUBCUTANEOUS at 19:24

## 2023-08-02 RX ADMIN — TAMSULOSIN HYDROCHLORIDE 0.4 MILLIGRAM(S): 0.4 CAPSULE ORAL at 21:37

## 2023-08-02 NOTE — CHART NOTE - NSCHARTNOTEFT_GEN_A_CORE
RONNY NAVARRETE 78yrs s/p cardiac cath via right femoral access.  Site is stable with no hematoma, active bleed or swelling.  Dressing is clean/dry/intact.  DP pulse is palpable. Extremities warm to touch. Pulses present b/l, capillary refill appropriate. Patient denies pain, numbness, tingling, CP, SOB. VSS. Will continue to monitor.     T(C): 36.5 (08-02-23 @ 20:30), Max: 36.5 (08-02-23 @ 20:30)  HR: 63 (08-02-23 @ 20:30) (63 - 63)  BP: 128/69 (08-02-23 @ 20:30) (128/69 - 128/69)  RR: 19 (08-02-23 @ 20:30) (19 - 19)  SpO2: 96% (08-02-23 @ 20:30) (96% - 96%)

## 2023-08-02 NOTE — CHART NOTE - NSCHARTNOTEFT_GEN_A_CORE
s/p leadless ppm implant via RFV access with suture placement with hemostasis. Advised by Dr Saravia patient bedrest until 1830 and discharge tonight at 1900 if greoin site remains stable. He spoke with patient and his wife who works as an RN in the IR Suite who agree to return at 0730am tmrw, 8/3 for suture removal with Dr Saravia in the office

## 2023-08-02 NOTE — CHART NOTE - NSCHARTNOTEFT_GEN_A_CORE
The patient presents today for leadless permanent pacemaker  See H&P from presurgical testing.   The patient denies any new complaints since the last time he was seen by Dr. Saravia  Medications reviewed.    NPO Date and Time: 8/1/23 at   Mallampati: 2  Anticoagulation Date and Time? Eliquis on   Farxiga last dose 7/30/23 at   Previous Endoscopy?  NO  Hx of CVA?  NO  Sleep Apnea?  NO  Dentures? NO  Loose Teeth? NO      Patient Denies:    Prior difficult intubation or airway problems  Odynophagia  Dysphagia  Esophageal stricture  Esophageal tumor  Esophageal varices  Esophageal perforation/laceration  Esophageal diverticulum  Large diaphragmatic Hernia  Active or recent upper gastrointestinal (GI) bleed  History of GI surgery  History of Esophageal surgery  History of Womack's esophagus  Tenuous cardiorespiratory status  Cervical spine arthritis with reduced range of motion or atlantoaxial joint disease. History of radiation to head, neck, or mediastinum  Severe thrombocytopenia  Obstructive sleep apnea The patient presents today for leadless permanent pacemaker  See H&P from presurgical testing.   The patient denies any new complaints since the last time he was seen by Dr. Saravia  Medications reviewed.    NPO Date and Time: 8/1/23 at 1800  Mallampati: 2  Anticoagulation Date and Time? Eliquis on 8/1/23 at 2000  Farxiga last dose 7/29/23 at 0800  Previous Endoscopy?  NO  Hx of CVA?  NO  Sleep Apnea?  NO  Dentures? NO  Loose Teeth? NO      Patient Denies:    Prior difficult intubation or airway problems  Odynophagia  Dysphagia  Esophageal stricture  Esophageal tumor  Esophageal varices  Esophageal perforation/laceration  Esophageal diverticulum  Large diaphragmatic Hernia  Active or recent upper gastrointestinal (GI) bleed  History of GI surgery  History of Esophageal surgery  History of Womack's esophagus  Tenuous cardiorespiratory status  Cervical spine arthritis with reduced range of motion or atlantoaxial joint disease. History of radiation to head, neck, or mediastinum  Severe thrombocytopenia  Obstructive sleep apnea

## 2023-08-02 NOTE — CHART NOTE - NSCHARTNOTEFT_GEN_A_CORE
pt. s/p leadless Aveir pacemaker     R groin site  was pressure dressed. It was  clean, dry, and intact. No bleeding, drainage hematoma, or ecchymosis appreciated.    Pulses: +2DP/PT    Post-op PPM instruction has been verbally explained and given to the patient. Patient was also given home monitor, booklet and ID card. Patient expressed understanding and all questions were answered. A copy of the instruction is located in the patients chart   Patient is schedule to Follow up on 8/3 (tomorrow) at 7:30AM with Dr. Saravia for suture removal in the EP Clinic ( 4th floor Oncology building [facility] [address], [place], [place], [address] [phone] )    No scrubbing the incision site for 2 weeks  No lotion, ointment, powder or direct sunlight to the incision site for 2 weeks   No lifting more than 5lb or exertional exercising such as jogging, running, bike riding for 6-8 weeks  Do not get the surgical incision wet for 1 week  No swimming pool, Jacuzzi, or bath for 6-8 weeks.   You should carry your ID card for metal detectors   Remove bandage after 24-48hours  Patient can shower 24 hours after procedure. Pat the area dry  Keep Cellular phone 6 in away  from the device  Pt was instructed to call [phone] if the following occurs:      - fever with temperature > 100.6      - swelling, drainage or bleeding at the site incision       - chest pain, SOB, n/v pt. s/p leadless Aveir pacemaker     Patient had one episode of R groin site bleeding when walking to bathroom, manual pressure applied by RN and hemostasis achieved. Pressure dressing applied with dry, and intact. No bleeding, palpable hematoma, or ecchymosis appreciated. patient is hemodynamically stable.   Patient will stay overnight for observation.   Pulses: +2DP/PT    Post-op PPM instruction has been verbally explained and given to the patient. Patient was also given home monitor, booklet and ID card. Patient expressed understanding and all questions were answered. A copy of the instruction is located in the patients chart   Patient is schedule to Follow up on 8/18/23 at 12:20pm with Dr. Saravia for suture removal in the EP Clinic ( 4th floor Oncology building [facility] [address] [place] [address], [address], [place], [address] [phone] )    No scrubbing the incision site for 2 weeks  No lotion, ointment, powder or direct sunlight to the incision site for 2 weeks   No lifting more than 5lb or exertional exercising such as jogging, running, bike riding for 6-8 weeks  Do not get the surgical incision wet for 1 week  No swimming pool, Jacuzzi, or bath for 6-8 weeks.   You should carry your ID card for metal detectors   Remove bandage after 24-48hours  Patient can shower 24 hours after procedure. Pat the area dry  Keep Cellular phone 6 in away  from the device  Pt was instructed to call [phone] if the following occurs:      - fever with temperature > 100.6      - swelling, drainage or bleeding at the site incision       - chest pain, SOB, n/v

## 2023-08-02 NOTE — PATIENT PROFILE ADULT - FALL HARM RISK - HARM RISK INTERVENTIONS

## 2023-08-03 ENCOUNTER — TRANSCRIPTION ENCOUNTER (OUTPATIENT)
Age: 78
End: 2023-08-03

## 2023-08-03 VITALS
HEART RATE: 87 BPM | TEMPERATURE: 98 F | OXYGEN SATURATION: 97 % | RESPIRATION RATE: 17 BRPM | SYSTOLIC BLOOD PRESSURE: 156 MMHG | DIASTOLIC BLOOD PRESSURE: 66 MMHG

## 2023-08-03 LAB
ANION GAP SERPL CALC-SCNC: 13 MMOL/L — SIGNIFICANT CHANGE UP (ref 7–14)
BUN SERPL-MCNC: 28 MG/DL — HIGH (ref 7–23)
CALCIUM SERPL-MCNC: 9.7 MG/DL — SIGNIFICANT CHANGE UP (ref 8.4–10.5)
CHLORIDE SERPL-SCNC: 101 MMOL/L — SIGNIFICANT CHANGE UP (ref 98–107)
CO2 SERPL-SCNC: 21 MMOL/L — LOW (ref 22–31)
CREAT SERPL-MCNC: 1.4 MG/DL — HIGH (ref 0.5–1.3)
EGFR: 52 ML/MIN/1.73M2 — LOW
GLUCOSE BLDC GLUCOMTR-MCNC: 124 MG/DL — HIGH (ref 70–99)
GLUCOSE SERPL-MCNC: 137 MG/DL — HIGH (ref 70–99)
HCT VFR BLD CALC: 39.7 % — SIGNIFICANT CHANGE UP (ref 39–50)
HGB BLD-MCNC: 12.1 G/DL — LOW (ref 13–17)
MAGNESIUM SERPL-MCNC: 2.2 MG/DL — SIGNIFICANT CHANGE UP (ref 1.6–2.6)
MCHC RBC-ENTMCNC: 24.6 PG — LOW (ref 27–34)
MCHC RBC-ENTMCNC: 30.5 GM/DL — LOW (ref 32–36)
MCV RBC AUTO: 80.7 FL — SIGNIFICANT CHANGE UP (ref 80–100)
NRBC # BLD: 0 /100 WBCS — SIGNIFICANT CHANGE UP (ref 0–0)
NRBC # FLD: 0 K/UL — SIGNIFICANT CHANGE UP (ref 0–0)
PLATELET # BLD AUTO: 272 K/UL — SIGNIFICANT CHANGE UP (ref 150–400)
POTASSIUM SERPL-MCNC: 5.2 MMOL/L — SIGNIFICANT CHANGE UP (ref 3.5–5.3)
POTASSIUM SERPL-SCNC: 5.2 MMOL/L — SIGNIFICANT CHANGE UP (ref 3.5–5.3)
RBC # BLD: 4.92 M/UL — SIGNIFICANT CHANGE UP (ref 4.2–5.8)
RBC # FLD: 16.7 % — HIGH (ref 10.3–14.5)
SODIUM SERPL-SCNC: 135 MMOL/L — SIGNIFICANT CHANGE UP (ref 135–145)
WBC # BLD: 7.84 K/UL — SIGNIFICANT CHANGE UP (ref 3.8–10.5)
WBC # FLD AUTO: 7.84 K/UL — SIGNIFICANT CHANGE UP (ref 3.8–10.5)

## 2023-08-03 RX ADMIN — SODIUM CHLORIDE 3 MILLILITER(S): 9 INJECTION INTRAMUSCULAR; INTRAVENOUS; SUBCUTANEOUS at 07:45

## 2023-08-03 RX ADMIN — Medication 88 MICROGRAM(S): at 06:10

## 2023-08-03 RX ADMIN — LOSARTAN POTASSIUM 50 MILLIGRAM(S): 100 TABLET, FILM COATED ORAL at 06:09

## 2023-08-03 RX ADMIN — AMLODIPINE BESYLATE 5 MILLIGRAM(S): 2.5 TABLET ORAL at 06:09

## 2023-08-03 NOTE — PROGRESS NOTE ADULT - SUBJECTIVE AND OBJECTIVE BOX
POST ANESTHESIA EVALUATION    78y Male POSTOP DAY 1      MENTAL STATUS: Patient participation [ x ] Awake     [  ] Arousable     [  ] Sedated    AIRWAY PATENCY: [x  ] Satisfactory  [  ] Other:     Vital Signs Last 24 Hrs  T(C): 36.4 (03 Aug 2023 12:02), Max: 36.6 (03 Aug 2023 06:08)  T(F): 97.5 (03 Aug 2023 12:02), Max: 97.9 (03 Aug 2023 06:08)  HR: 87 (03 Aug 2023 12:02) (62 - 87)  BP: 156/66 (03 Aug 2023 12:02) (128/69 - 156/66)  BP(mean): --  RR: 17 (03 Aug 2023 12:02) (17 - 19)  SpO2: 97% (03 Aug 2023 12:02) (96% - 99%)    Parameters below as of 03 Aug 2023 12:02  Patient On (Oxygen Delivery Method): room air      I&O's Summary    02 Aug 2023 07:01  -  03 Aug 2023 07:00  --------------------------------------------------------  IN: 0 mL / OUT: 900 mL / NET: -900 mL          NAUSEA/ VOMITTING:  [ x ] NONE  [  ] CONTROLLED [  ] OTHER     PAIN: [ x ] CONTROLLED WITH CURRENT REGIMEN  [  ] OTHER    [ x ] NO APPARENT ANESTHESIA COMPLICATIONS  
Patient is a 78y old  Male who presents with a chief complaint of Elective Pacemaker Placement (03 Aug 2023 07:14)    Patient denies CP, SOB or palpitations.    PAST MEDICAL & SURGICAL HISTORY:  AF (atrial fibrillation)  Eliquis    HTN (hypertension)    PVD (peripheral vascular disease)    HLD (hyperlipidemia)    DM (diabetes mellitus)    Obesity    History of BPH    No Past Surgical History    H/O colonoscopy        MEDICATIONS  (STANDING):  amLODIPine   Tablet 5 milliGRAM(s) Oral daily  atorvastatin 20 milliGRAM(s) Oral at bedtime  dextrose 5%. 1000 milliLiter(s) (50 mL/Hr) IV Continuous <Continuous>  dextrose 5%. 1000 milliLiter(s) (100 mL/Hr) IV Continuous <Continuous>  dextrose 50% Injectable 25 Gram(s) IV Push once  dextrose 50% Injectable 12.5 Gram(s) IV Push once  dextrose 50% Injectable 25 Gram(s) IV Push once  gabapentin 100 milliGRAM(s) Oral daily  glucagon  Injectable 1 milliGRAM(s) IntraMuscular once  insulin lispro (ADMELOG) corrective regimen sliding scale   SubCutaneous three times a day before meals  insulin lispro (ADMELOG) corrective regimen sliding scale   SubCutaneous at bedtime  levothyroxine 88 MICROGram(s) Oral daily  losartan 50 milliGRAM(s) Oral daily  sodium chloride 0.9% lock flush 3 milliLiter(s) IV Push every 8 hours  tamsulosin 0.4 milliGRAM(s) Oral at bedtime    MEDICATIONS  (PRN):  dextrose Oral Gel 15 Gram(s) Oral once PRN Blood Glucose LESS THAN 70 milliGRAM(s)/deciliter  methocarbamol 500 milliGRAM(s) Oral daily PRN Muscle Spasm            Vital Signs Last 24 Hrs  T(C): 36.6 (03 Aug 2023 06:08), Max: 36.6 (03 Aug 2023 06:08)  T(F): 97.9 (03 Aug 2023 06:08), Max: 97.9 (03 Aug 2023 06:08)  HR: 62 (03 Aug 2023 06:08) (62 - 63)  BP: 139/62 (03 Aug 2023 06:08) (128/69 - 139/62)  BP(mean): --  RR: 18 (03 Aug 2023 06:08) (18 - 19)  SpO2: 99% (03 Aug 2023 06:08) (96% - 99%)    Parameters below as of 03 Aug 2023 06:08  Patient On (Oxygen Delivery Method): room air                INTERPRETATION OF TELEMETRY:  AF with V paced 60    ECG:  AF with V paced      LABS:                        12.1   7.84  )-----------( 272      ( 03 Aug 2023 06:40 )             39.7     08-03    135  |  101  |  28<H>  ----------------------------<  137<H>  5.2   |  21<L>  |  1.40<H>    Ca    9.7      03 Aug 2023 06:40  Mg     2.20     08-03            Urinalysis Basic - ( 03 Aug 2023 06:40 )    Color: x / Appearance: x / SG: x / pH: x  Gluc: 137 mg/dL / Ketone: x  / Bili: x / Urobili: x   Blood: x / Protein: x / Nitrite: x   Leuk Esterase: x / RBC: x / WBC x   Sq Epi: x / Non Sq Epi: x / Bacteria: x        BNP  RADIOLOGY & ADDITIONAL STUDIES:    Ejection Fraction (Teicholtz): 69 %  ------------------------------------------------------------------------  OBSERVATIONS:  Mitral Valve: Mitral annular calcification, otherwise  normal mitral valve. Moderate mitral regurgitation.  Aortic Root: Normal aortic root.  Aortic Valve: Calcified trileaflet aortic valve with normal  opening.  Left Atrium: Normal left atrium.  LA volume index = 34  cc/m2.  Left Ventricle: Endocardial visualization enhanced with  intravenous injection of echo contrast (Definity). Normal  left ventricular systolic function. No segmental wall  motionabnormalities. Normal left ventricular internal  dimensions and wall thicknesses. (DT:126 ms).  Right Heart: Normal right atrium. The right ventricle is  not well visualized. Normal tricuspid valve. Moderate  tricuspid regurgitation. Normal pulmonic valve.  Pericardium/PleuraNormal pericardium with no pericardial  effusion.  ------------------------------------------------------------------------  CONCLUSIONS:  1. Mitral annular calcification, otherwise normal mitral  valve. Moderate mitral regurgitation.  2. Calcified trileaflet aortic valve with normal opening.  3. Normal left atrium.  LA volume index = 34 cc/m2.  4. Endocardial visualization enhanced with intravenous  injection of echo contrast (Definity). Normal left  ventricular systolicfunction. No segmental wall motion  abnormalities.  5. The right ventricle is not well visualized.  6. Normal tricuspid valve. Moderate tricuspid  regurgitation.  ------------------------------------------------------------------------  Confirmed on11/29/2021 - 16:27:14 by THEODORE Nowak  ------------------------------------------------------------------------        PHYSICAL EXAM:    GENERAL: In no apparent distress  NECK: Supple and normal thyroid.  No JVD or carotid bruit.  Carotid pulse is 2+ bilaterally.  HEART: S1S2 irregularly irregular; No murmurs, rubs, or gallops.  PULMONARY: Clear to auscultation and perfusion.  No rales, wheezing, or rhonchi bilaterally.  ABDOMEN: Soft, Nontender, Nondistended; Bowel sounds present  EXTREMITIES:  2+ Peripheral Pulses, No clubbing, cyanosis, or edema  R groin site suture removed, no active bleeding or hematoma  NEUROLOGICAL: alert oriented x4 speech clear no focal deficit

## 2023-08-03 NOTE — DISCHARGE NOTE PROVIDER - HOSPITAL COURSE
77 y/o male, with a PmHx of DM, HTN, Obesity, Afib, HLD, BPH, PVD with healed stasis ulcers, presented to [facility] for a scheduled AVEIR by Wong Leadless PPM with Dr Saravia. Pt has hx a-fib with bradycardia and occasional dizziness with increased ROSENBERG.

## 2023-08-03 NOTE — DISCHARGE NOTE NURSING/CASE MANAGEMENT/SOCIAL WORK - NSDCFUADDAPPT_GEN_ALL_CORE_FT
Follow up with your Primary Care Doctor.    You are scheduled for a follow up appointment with Dr. Saravia on 8/18/23 @ 12pm on the 4th floor of the [facility] Oncology Building. [phone].    No scrubbing the incision site for 2 weeks. No lotion, ointment, powder or direct sunlight to the incision site for 2 weeks. No lifting more than 5lb or exertional exercising such as jogging, running, bike riding for 6-8 weeks. Do not get the surgical incision wet for 1 week. No swimming pool, Jacuzzi, or bath for 6-8 weeks.  You should carry your ID card for metal detectors. Remove bandage after 24-48hours. Patient can shower 24 hours after procedure. Pat the area dry. Keep Cellular phone 6 in away  from the device  Pt was instructed to call [phone] if the following occurs: fever with temperature > 100.6, swelling, drainage or bleeding at the site incision, chest pain, SOB, n/v.

## 2023-08-03 NOTE — DISCHARGE NOTE PROVIDER - NSDCFUADDAPPT_GEN_ALL_CORE_FT
Follow up with your Primary Care Doctor.    You are scheduled for a follow up appointment with Dr. Saravia on 8/18/23 @ 12pm on the 4th floor of the [facility] Oncology Building. [phone].    No scrubbing the incision site for 2 weeks  No lotion, ointment, powder or direct sunlight to the incision site for 2 weeks   No lifting more than 5lb or exertional exercising such as jogging, running, bike riding for 6-8 weeks  Do not get the surgical incision wet for 1 week  No swimming pool, Jacuzzi, or bath for 6-8 weeks.   You should carry your ID card for metal detectors   Remove bandage after 24-48hours  Patient can shower 24 hours after procedure. Pat the area dry  Keep Cellular phone 6 in away  from the device  Pt was instructed to call [phone] if the following occurs:      - fever with temperature > 100.6      - swelling, drainage or bleeding at the site incision       - chest pain, SOB, n/v. Follow up with your Primary Care Doctor.    You are scheduled for a follow up appointment with Dr. Saravia on 8/18/23 @ 12pm on the 4th floor of the [facility] Oncology Building. [phone].    No scrubbing the incision site for 2 weeks. No lotion, ointment, powder or direct sunlight to the incision site for 2 weeks. No lifting more than 5lb or exertional exercising such as jogging, running, bike riding for 6-8 weeks. Do not get the surgical incision wet for 1 week. No swimming pool, Jacuzzi, or bath for 6-8 weeks.  You should carry your ID card for metal detectors. Remove bandage after 24-48hours. Patient can shower 24 hours after procedure. Pat the area dry. Keep Cellular phone 6 in away  from the device  Pt was instructed to call [phone] if the following occurs: fever with temperature > 100.6, swelling, drainage or bleeding at the site incision, chest pain, SOB, n/v.

## 2023-08-03 NOTE — DISCHARGE NOTE NURSING/CASE MANAGEMENT/SOCIAL WORK - PATIENT PORTAL LINK FT
You can access the FollowMyHealth Patient Portal offered by [facility] by registering at the following website: [email or web address]. By joining [employer]’s FollowMyHealth portal, you will also be able to view your health information using other applications (apps) compatible with our system.

## 2023-08-03 NOTE — DISCHARGE NOTE PROVIDER - NSDCCPCAREPLAN_GEN_ALL_CORE_FT
PRINCIPAL DISCHARGE DIAGNOSIS  Diagnosis: Bradycardia  Assessment and Plan of Treatment: You were found to have bradycardia and had an elective procedure to place an Abbott Micra leadless pacemaker placement.      SECONDARY DISCHARGE DIAGNOSES  Diagnosis: Diabetes  Assessment and Plan of Treatment: To maintain a normal blood glucose level and HgA1C level < 5.7 and to prevent diabetic complications such as uncontrolled diabetes, diabetic coma, blindness, renal failure, and amputations.   Monitor finger sticks pre-meal and bedtime, low salt, fat and carbohydrate diet, minimize glucose intake.  Exercise daily for at least 30 minutes and weight loss.  Follow up with primary care physician and endocrinologist for routine Hemoglobin A1C checks and management.  Follow up with your ophthalmologist for routine yearly vision exams.    Diagnosis: HTN (hypertension)  Assessment and Plan of Treatment: To maintain a normal blood pressure to prevent heart attack, stroke and renal failure.   Low sodium and fat diet, continue anti-hypertensive medications, and follow up with primary care physician.    Diagnosis: Atrial fibrillation  Assessment and Plan of Treatment: To restore or maintain a normal heart rate and rhythm, to prevent blood clots, and decrease the risks of stroke CVA/TIA.   Please take your medications as prescribed.  Continue to take your blood thinner as prescribed and follow with your physician to monitor your levels.  Low fat diet, reduce caffeine intake, and exercise at least 30 minutes daily.    Diagnosis: HLD (hyperlipidemia)  Assessment and Plan of Treatment: To maintain normal cholesterol levels to prevent stroke, coronary artery disease, peripheral vascular disease and heart attacks.   Low fat diet, exercise daily and continue current medications. Follow up with primary care physician and cardiologist for management.    Diagnosis: BPH (benign prostatic hyperplasia)  Assessment and Plan of Treatment: Continue your previously prescribed medications. Follow up with your primary care doctor.     PRINCIPAL DISCHARGE DIAGNOSIS  Diagnosis: Bradycardia  Assessment and Plan of Treatment: You were found to have bradycardia and had an elective procedure to place an Abbott Micra leadless pacemaker placement.      SECONDARY DISCHARGE DIAGNOSES  Diagnosis: Diabetes  Assessment and Plan of Treatment: To maintain a normal blood glucose level and HgA1C level < 5.7 and to prevent diabetic complications such as uncontrolled diabetes, diabetic coma, blindness, renal failure, and amputations.   Monitor finger sticks pre-meal and bedtime, low salt, fat and carbohydrate diet, minimize glucose intake.  Exercise daily for at least 30 minutes and weight loss.  Follow up with primary care physician and endocrinologist for routine Hemoglobin A1C checks and management.  Follow up with your ophthalmologist for routine yearly vision exams.    Diagnosis: HTN (hypertension)  Assessment and Plan of Treatment: To maintain a normal blood pressure to prevent heart attack, stroke and renal failure.   Low sodium and fat diet, continue anti-hypertensive medications, and follow up with primary care physician.    Diagnosis: Atrial fibrillation  Assessment and Plan of Treatment: To restore or maintain a normal heart rate and rhythm, to prevent blood clots, and decrease the risks of stroke CVA/TIA.   Please take your medications as prescribed.  Continue to take your blood thinner as prescribed and follow with your physician to monitor your levels.  Low fat diet, reduce caffeine intake, and exercise at least 30 minutes daily.    Diagnosis: HLD (hyperlipidemia)  Assessment and Plan of Treatment: To maintain normal cholesterol levels to prevent stroke, coronary artery disease, peripheral vascular disease and heart attacks.   Low fat diet, exercise daily and continue current medications. Follow up with primary care physician and cardiologist for management.    Diagnosis: BPH (benign prostatic hyperplasia)  Assessment and Plan of Treatment: Continue your previously prescribed medications. Follow up with your primary care doctor.    Diagnosis: Hypothyroidism  Assessment and Plan of Treatment: Cont Levothyroxine as prescribed. Follow up with your primary care doctor.

## 2023-08-03 NOTE — ASSESSMENT
[FreeTextEntry1] : Wound Assessment and Plan:  The patient presents with a wound to the bilateral lower extremities.  Swelling noted to the extremities.   Vascular procedures planned with Dr. Morrison in March. Bilateral palpable pulses. Moderate edema. Wound beds are red with large amount of yellow serous drainage.LLE wound with periwound erythema. Wounds with increased drainage and pain. Dressing changed to Adaptic and super absorber/Osbaldo/ACE wraps. s/p excisional debridement Compression garments ordered.   2/22/23 bilateral leg wounds, right leg warm to touch, slight tenderness, has been using shilpi, telfa over wounds, incorrect supplies ordered, has not received wrap yet s/p excisional debridement He read the [facility] consent and signed it prior to the initiation of any screening procedures.  He had the opportunity to discuss any questions regarding the study.  I witnessed the signing of the consent and the patient was given a copy of the signed consent.   03/20/2023 bilateral leg wounds, right leg warm to touch, slight tenderness, has been using shilpi, adaptic over wounds patient received circaid wraps s/p excisional debridement Patient underwent evaluation for peripheral arterial disease using a manual procedure  Ankle brachial index was obtained using blood pressure cuffs of the ankle and brachial segments of both legs.  The procedure was supervised and interpreted by the physician.  MAUDE of the right lower extremity 1.17.   MUADE of the left lower extremity 1.1.  Waveform noted to be (triphasic).   Patient tolerated procedure well in the office.  Results discussed with the patient. Lower extremity wounds dressed with Adaptic/Drawtex/Osbaldo Patient educated by nurse in the proper use of Circaid compression garments Circaids placed on patient today Verified with company supplies to ship on March 23 RTO to office in two weeks chronic lymphedema - has pump very old and not working  4/24/23 Wound Assessment and Plan: The patient presents with a wounds to the bilateral lower extremities.  Minimal swelling noted to the extremities.   On exam: Wound beds with red granulation tissue, thin layer of yellow slough, no odor or erythema present, periwound skin dry and scaly No clinical sign of infection Recommendation: Apply lidocaine or topical anesthetic if needed to reduce pain upon washing the wound. Wash wound with Dove skin sensitive soap and clean water  Apply Drawtex to the wound beds/Osbaldo/Circaids Change dressing --every other day Leg elevation as tolerated Encouraged ambulation or exercise. Optimization of nutrition. Continue using Lymphedema pump twice daily Wound supplies ordered via DME Patient given contact information to DME Follow up appointment scheduled for two weeks  06/05/2023 Wound Assessment and Plan: The patient presents with a wounds to the bilateral lower extremities.  swelling noted to the extremities.  pt complaints of increased pain at night and when ambulating On exam: Wound beds with red granulation tissue, thin layer of yellow slough, no odor or erythema present, periwound skin dry and scaly No clinical sign of infection s/p excisional debridement Dynaflex will be applied today / The patient was positioned to allow for optimization of imaging. The fluorescence imaging device was placed 8-12 cm from the patient and the clinical darkness required for imaging was obtained by a dark drape. The wound measured () on the () . The Usetracet i:X fluorescence imaging device was used to report presence and location of cyan fluorescence, indicative of bacteria at loads greater than 104 CFU/g in real-time. Images reported to have cyan fluorescence. The wound was mechanically cleansed with Dakins 0.125% and underwent excisional debridement. Fluorescence images acquired after cleansing demonstrated reduction in bacteria.  Patient enrolled in affinity study, signed the consents  6/12/23 The patient presents with a wounds to the bilateral lower extremities. Swelling noted to the extremities. Patient reports a decrease in pain and drainage. On exam: Wound beds with red granulation tissue, thin layer of yellow slough, no odor or erythema present, periwound skin dry and scaly No clinical sign of infection s/p excisional debridement Drawtex/Osbaldo/ACE wraps Will continue to use daily compression garments Lymphedema pump daily. The patient was positioned to allow for optimization of imaging. The fluorescence imaging device was placed 8-12 cm from the patient and the clinical darkness required for imaging was obtained by a dark drape. The wound measured see TA. The Usetracet i:X fluorescence imaging device was used to report presence and location of cyan fluorescence, indicative of bacteria at loads greater than 104 CFU/g in real-time. Images reported to have cyan fluorescence. The wound was mechanically cleansed with Vashe and underwent excisional debridement. Fluorescence images acquired after cleansing demonstrated reduction in bacteria.  Patient enrolled in affinity study, signed the consents     07/03/2023 The patient presents with a wounds to the bilateral lower extremities. Swelling noted to the extremities. Patient reports a decrease in  drainage. Today being randomized for Affinity study On exam: Wound beds with red granulation tissue, thin layer of yellow slough, no odor or erythema present, periwound skin dry and scaly No clinical sign of infection s/p excisional debridement Drawtex/Osbaldo/ACE wraps Will continue to use daily compression garments Lymphedema pump daily.  7/10/23 The patient presents with a wounds to the bilateral lower extremities. Patient reports a decrease in drainage. Patient participating in the Affinity study. On exam: Wound beds with red granulation tissue, thin layer of yellow slough, no odor or erythema present, periwound skin  No clinical sign of infection s/p excisional debridement Drawtex/Osbaldo/ACE wraps Will continue to use daily compression garments Lymphedema pump daily.  7/19/23 The patient presents with a wounds to the bilateral lower extremities. Patient reports a decrease in drainage.  On exam: Wounds improving beds with red granulation tissue, no odor or erythema present, periwound skin with hyperpigmented No clinical sign of infection s/p mechanical debridement Aquacel/Osbaldo/Compression garments Continue Lymphedema pump daily.  07/24/2023 The patient presents with a follow up on  wounds to the bilateral lower extremities. Patient reports a decrease in drainage.  On exam: Left leg ulcer healed Right leg wound improving beds with red granulation tissue, no odor or erythema present, periwound skin with hyperpigmented No clinical sign of infection s/p excisional debridement Aquacel/Osbaldo/Compression garments Continue Lymphedema pump daily.  08/02/2023 The patient presents with a follow up on  wounds to the bilateral lower extremities.  On exam: Left leg ulcer healed Right leg wound improving beds with red granulation tissue, no odor or erythema present, periwound skin with hyperpigmented No clinical sign of infection s/p excisional debridement Aquacel/Osbaldo/Compression garments Continue Lymphedema pump daily.

## 2023-08-03 NOTE — PROGRESS NOTE ADULT - ASSESSMENT
78 year old male with a PMH of HTN, HLD, DMT2, AF-on Eliquis, BPH, PVD and bradycardia admitted for elective a permanent pacemaker on 8/2.  s/p a leadless Aveir (Abbott) pacemaker implantation via RFV.  Post implant developed bleeding from access site, admitted for overnight monitoring.  Patient remains hemodynamically stable overnight.    -ppm interrogation done by Wong Rep with normal findings   -suture removed, Post device implantation teaching with instructions reviewed patient verbalized understanding.    Follow up on 8/8 at 12:20 for pacemaker check up and see Dr. Saravia at 1:00pm.  Stable for discharge home.

## 2023-08-03 NOTE — PHYSICAL EXAM
[2+] : left 2+ [Ankle Swelling (On Exam)] : present [Ankle Swelling Bilaterally] : bilaterally  [Ankle Swelling On The Right] : mild [] : bilaterally [Skin Ulcer] : ulcer [Alert] : alert [Oriented to Person] : oriented to person [Oriented to Place] : oriented to place [Oriented to Time] : oriented to time [Calm] : calm [Please See PDF for [employer]] : Please See PDF for [employer]. [de-identified] : NAD obese [de-identified] : kwaku [de-identified] : supple [de-identified] : equal chest rise [de-identified] : bilateral lower extremities ulcer

## 2023-08-03 NOTE — DISCHARGE NOTE NURSING/CASE MANAGEMENT/SOCIAL WORK - NSDCPEFALRISK_GEN_ALL_CORE
For information on Fall & Injury Prevention, visit: [email or web address] OR  [email or web address] OR  https://www.cdc.gov/steadi/patient.html

## 2023-08-03 NOTE — PLAN
[FreeTextEntry1] : 03/20/2023 Plan: Apply lidocaine or topical anesthetic if needed to reduce pain upon washing the wound. Wash wound with Dove skin sensitive soap and clean water when showering Wash wounds with Vashe and pat dry before dressing applied.  new supplies sent in so pt. can have appropriate supplies Apply Adaptic/Xtrasorb dressing. Apply multi-layer compression bandage ACE bandage Change dressing every other day Written instructions for dressing change given to patient. Leg elevation as tolerated order lymphedema pump  4/24/23 Plan: Apply Drawtex/Osbaldo/Circaids Change every other day Leg elevation as tolerated RTO in two weeks Continue using lymphedema pump twice daily Encouraged ambulation or exercise. Wound supplies ordered via DME Patient given contact information to DME  06/05/2023 s/p excisional debridement Apply Drawtex/Osbaldo/Circaids Change every other day Leg elevation as tolerated RTO in two weeks Continue using lymphedema pump twice daily Encouraged ambulation or exercise. Wound supplies ordered via DME Patient given contact information to DME  6/12/23 Plan: Apply Drawtex/Osbaldo/Circaids Change every other day Leg elevation as tolerated RTO in two weeks Continue using lymphedema pump twice daily Encouraged ambulation or exercise.  07/03/2023 Apply Drawtex/Osbaldo/Circaids Change every other day Leg elevation as tolerated RTO in two weeks Continue using lymphedema pump twice daily Encouraged ambulation or exercise.  7/10/23 Apply Drawtex/Osbaldo/Circaids Change every other day Leg elevation as tolerated RTO in one week Continue using lymphedema pump twice daily Encouraged ambulation or exercise.  7/19/23 Apply Aquacel/Osbaldo/Compression garments Change every other day Leg elevation as tolerated Continue Lymphedema pump twice daily Encourage exercise and ambulation RTO in one week   07/24/2023 Apply Aquacel/Osbaldo/Compression garments Change every other day Leg elevation as tolerated Continue Lymphedema pump twice daily Encourage exercise and ambulation RTO in one week  08/02/2023 Apply Aquacel/Osbaldo/Compression garments Change every other day Leg elevation as tolerated Continue Lymphedema pump twice daily Encourage exercise and ambulation RTO in one week

## 2023-08-03 NOTE — DISCHARGE NOTE PROVIDER - CARE PROVIDERS DIRECT ADDRESSES
[email or web address]

## 2023-08-03 NOTE — PROVIDER CONTACT NOTE (OTHER) - SITUATION
[address] Abdirashid /58 he hasn't gotten his olmesartan which he normally takes at home will advise to take when he gets home, asymptomatic otherwise, discharging now!

## 2023-08-03 NOTE — DISCHARGE NOTE PROVIDER - NSDCMRMEDTOKEN_GEN_ALL_CORE_FT
amLODIPine 5 mg oral tablet: 1 tab(s) orally once a day (in the morning)  atorvastatin 20 mg oral tablet: 1 tab(s) orally once a day (in the morning)  Eliquis 5 mg oral tablet: 1 tab(s) orally 2 times a day  Farxiga 10 mg oral tablet: 1 tab(s) orally once a day (in the morning)  gabapentin 100 mg oral tablet: 1 tab(s) orally once a day as needed  levothyroxine 88 mcg (0.088 mg) oral capsule: 1 cap(s) orally once a day (in the morning)  metFORMIN 1000 mg oral tablet: 1 tab(s) orally 2 times a day  methocarbamol 500 mg oral tablet: 1 tab(s) orally once a day as needed  olmesartan 20 mg oral tablet: 1 tab(s) orally once a day (in the morning)  tamsulosin 0.4 mg oral capsule: 1 cap(s) orally once a day (in the morning)

## 2023-08-03 NOTE — DISCHARGE NOTE PROVIDER - CARE PROVIDER_API CALL
Pipe Saravia.  Cardiovascular Disease  [address] [place] [address], [address]  [place] [address]  Phone: [phone]  Fax: [phone]  Follow Up Time:     Sofy Sanders  Internal Medicine  [address] [place] [address]  Phone: [phone]  Fax: [phone]  Follow Up Time:

## 2023-08-07 ENCOUNTER — APPOINTMENT (OUTPATIENT)
Dept: WOUND CARE | Facility: CLINIC | Age: 78
End: 2023-08-07
Payer: COMMERCIAL

## 2023-08-07 ENCOUNTER — OUTPATIENT (OUTPATIENT)
Dept: OUTPATIENT SERVICES | Facility: HOSPITAL | Age: 78
LOS: 1 days | End: 2023-08-07
Payer: COMMERCIAL

## 2023-08-07 VITALS
DIASTOLIC BLOOD PRESSURE: 72 MMHG | WEIGHT: 200 LBS | OXYGEN SATURATION: 99 % | RESPIRATION RATE: 16 BRPM | HEIGHT: 70 IN | HEART RATE: 61 BPM | SYSTOLIC BLOOD PRESSURE: 144 MMHG | TEMPERATURE: 97.7 F | BODY MASS INDEX: 28.63 KG/M2

## 2023-08-07 DIAGNOSIS — Z98.890 OTHER SPECIFIED POSTPROCEDURAL STATES: Chronic | ICD-10-CM

## 2023-08-07 PROCEDURE — 11042 DBRDMT SUBQ TIS 1ST 20SQCM/<: CPT

## 2023-08-08 ENCOUNTER — APPOINTMENT (OUTPATIENT)
Dept: ELECTROPHYSIOLOGY | Facility: CLINIC | Age: 78
End: 2023-08-08
Payer: COMMERCIAL

## 2023-08-08 PROBLEM — I73.9 PERIPHERAL VASCULAR DISEASE, UNSPECIFIED: Chronic | Status: ACTIVE | Noted: 2023-07-26

## 2023-08-08 PROBLEM — E78.5 HYPERLIPIDEMIA, UNSPECIFIED: Chronic | Status: ACTIVE | Noted: 2023-07-26

## 2023-08-08 PROBLEM — I10 ESSENTIAL (PRIMARY) HYPERTENSION: Chronic | Status: ACTIVE | Noted: 2023-07-26

## 2023-08-08 PROBLEM — E66.9 OBESITY, UNSPECIFIED: Chronic | Status: ACTIVE | Noted: 2023-07-26

## 2023-08-08 PROCEDURE — 99024 POSTOP FOLLOW-UP VISIT: CPT

## 2023-08-09 DIAGNOSIS — L97.929 NON-PRESSURE CHRONIC ULCER OF UNSPECIFIED PART OF LEFT LOWER LEG WITH UNSPECIFIED SEVERITY: ICD-10-CM

## 2023-08-09 DIAGNOSIS — I87.2 VENOUS INSUFFICIENCY (CHRONIC) (PERIPHERAL): ICD-10-CM

## 2023-08-09 DIAGNOSIS — L97.919 NON-PRESSURE CHRONIC ULCER OF UNSPECIFIED PART OF RIGHT LOWER LEG WITH UNSPECIFIED SEVERITY: ICD-10-CM

## 2023-08-09 DIAGNOSIS — E11.9 TYPE 2 DIABETES MELLITUS WITHOUT COMPLICATIONS: ICD-10-CM

## 2023-08-09 DIAGNOSIS — I87.333 CHRONIC VENOUS HYPERTENSION (IDIOPATHIC) WITH ULCER AND INFLAMMATION OF BILATERAL LOWER EXTREMITY: ICD-10-CM

## 2023-08-10 PROBLEM — E11.9 TYPE 2 DIABETES MELLITUS WITHOUT COMPLICATIONS: Chronic | Status: ACTIVE | Noted: 2023-07-26

## 2023-08-10 PROBLEM — Z87.438 PERSONAL HISTORY OF OTHER DISEASES OF MALE GENITAL ORGANS: Chronic | Status: ACTIVE | Noted: 2023-07-26

## 2023-08-10 NOTE — PHYSICAL EXAM
[2+] : left 2+ [Ankle Swelling (On Exam)] : present [Ankle Swelling Bilaterally] : bilaterally  [Ankle Swelling On The Right] : mild [] : bilaterally [Skin Ulcer] : ulcer [Alert] : alert [Oriented to Person] : oriented to person [Oriented to Place] : oriented to place [Oriented to Time] : oriented to time [Calm] : calm [Please See PDF for [employer]] : Please See PDF for [employer]. [de-identified] : NAD obese [de-identified] : kwaku [de-identified] : supple [de-identified] : equal chest rise [de-identified] : bilateral lower extremities ulcer

## 2023-08-10 NOTE — ASSESSMENT
[FreeTextEntry1] : Wound Assessment and Plan:  The patient presents with a wound to the bilateral lower extremities.  Swelling noted to the extremities.   Vascular procedures planned with Dr. Morrison in March. Bilateral palpable pulses. Moderate edema. Wound beds are red with large amount of yellow serous drainage.LLE wound with periwound erythema. Wounds with increased drainage and pain. Dressing changed to Adaptic and super absorber/Osbaldo/ACE wraps. s/p excisional debridement Compression garments ordered.   2/22/23 bilateral leg wounds, right leg warm to touch, slight tenderness, has been using shilpi, telfa over wounds, incorrect supplies ordered, has not received wrap yet s/p excisional debridement He read the [facility] consent and signed it prior to the initiation of any screening procedures.  He had the opportunity to discuss any questions regarding the study.  I witnessed the signing of the consent and the patient was given a copy of the signed consent.   03/20/2023 bilateral leg wounds, right leg warm to touch, slight tenderness, has been using shilpi, adaptic over wounds patient received circaid wraps s/p excisional debridement Patient underwent evaluation for peripheral arterial disease using a manual procedure  Ankle brachial index was obtained using blood pressure cuffs of the ankle and brachial segments of both legs.  The procedure was supervised and interpreted by the physician.  MAUDE of the right lower extremity 1.17.   MAUDE of the left lower extremity 1.1.  Waveform noted to be (triphasic).   Patient tolerated procedure well in the office.  Results discussed with the patient. Lower extremity wounds dressed with Adaptic/Drawtex/Osbaldo Patient educated by nurse in the proper use of Circaid compression garments Circaids placed on patient today Verified with company supplies to ship on March 23 RTO to office in two weeks chronic lymphedema - has pump very old and not working  4/24/23 Wound Assessment and Plan: The patient presents with a wounds to the bilateral lower extremities.  Minimal swelling noted to the extremities.   On exam: Wound beds with red granulation tissue, thin layer of yellow slough, no odor or erythema present, periwound skin dry and scaly No clinical sign of infection Recommendation: Apply lidocaine or topical anesthetic if needed to reduce pain upon washing the wound. Wash wound with Dove skin sensitive soap and clean water  Apply Drawtex to the wound beds/Osbaldo/Circaids Change dressing --every other day Leg elevation as tolerated Encouraged ambulation or exercise. Optimization of nutrition. Continue using Lymphedema pump twice daily Wound supplies ordered via DME Patient given contact information to DME Follow up appointment scheduled for two weeks  06/05/2023 Wound Assessment and Plan: The patient presents with a wounds to the bilateral lower extremities.  swelling noted to the extremities.  pt complaints of increased pain at night and when ambulating On exam: Wound beds with red granulation tissue, thin layer of yellow slough, no odor or erythema present, periwound skin dry and scaly No clinical sign of infection s/p excisional debridement Dynaflex will be applied today / The patient was positioned to allow for optimization of imaging. The fluorescence imaging device was placed 8-12 cm from the patient and the clinical darkness required for imaging was obtained by a dark drape. The wound measured () on the () . The [employer] i:X fluorescence imaging device was used to report presence and location of cyan fluorescence, indicative of bacteria at loads greater than 104 CFU/g in real-time. Images reported to have cyan fluorescence. The wound was mechanically cleansed with Dakins 0.125% and underwent excisional debridement. Fluorescence images acquired after cleansing demonstrated reduction in bacteria.  Patient enrolled in affinity study, signed the consents  6/12/23 The patient presents with a wounds to the bilateral lower extremities. Swelling noted to the extremities. Patient reports a decrease in pain and drainage. On exam: Wound beds with red granulation tissue, thin layer of yellow slough, no odor or erythema present, periwound skin dry and scaly No clinical sign of infection s/p excisional debridement Drawtex/Osbaldo/ACE wraps Will continue to use daily compression garments Lymphedema pump daily. The patient was positioned to allow for optimization of imaging. The fluorescence imaging device was placed 8-12 cm from the patient and the clinical darkness required for imaging was obtained by a dark drape. The wound measured see TA. The [employer] i:X fluorescence imaging device was used to report presence and location of cyan fluorescence, indicative of bacteria at loads greater than 104 CFU/g in real-time. Images reported to have cyan fluorescence. The wound was mechanically cleansed with Vashe and underwent excisional debridement. Fluorescence images acquired after cleansing demonstrated reduction in bacteria.  Patient enrolled in affinity study, signed the consents     07/03/2023 The patient presents with a wounds to the bilateral lower extremities. Swelling noted to the extremities. Patient reports a decrease in  drainage. Today being randomized for Affinity study On exam: Wound beds with red granulation tissue, thin layer of yellow slough, no odor or erythema present, periwound skin dry and scaly No clinical sign of infection s/p excisional debridement Drawtex/Osbaldo/ACE wraps Will continue to use daily compression garments Lymphedema pump daily.  7/10/23 The patient presents with a wounds to the bilateral lower extremities. Patient reports a decrease in drainage. Patient participating in the Affinity study. On exam: Wound beds with red granulation tissue, thin layer of yellow slough, no odor or erythema present, periwound skin  No clinical sign of infection s/p excisional debridement Drawtex/Osbaldo/ACE wraps Will continue to use daily compression garments Lymphedema pump daily.  7/19/23 The patient presents with a wounds to the bilateral lower extremities. Patient reports a decrease in drainage.  On exam: Wounds improving beds with red granulation tissue, no odor or erythema present, periwound skin with hyperpigmented No clinical sign of infection s/p mechanical debridement Aquacel/Osbaldo/Compression garments Continue Lymphedema pump daily.  07/24/2023 The patient presents with a follow up on  wounds to the bilateral lower extremities. Patient reports a decrease in drainage.  On exam: Left leg ulcer healed Right leg wound improving beds with red granulation tissue, no odor or erythema present, periwound skin with hyperpigmented No clinical sign of infection s/p excisional debridement Aquacel/Osbaldo/Compression garments Continue Lymphedema pump daily.  08/02/2023 The patient presents with a follow up on  wounds to the bilateral lower extremities.  On exam: Left leg ulcer healed Right leg wound improving beds with red granulation tissue, no odor or erythema present, periwound skin with hyperpigmented No clinical sign of infection s/p excisional debridement Aquacel/Osbaldo/Compression garments Continue Lymphedema pump daily.  08/07/2023 The patient presents with a follow up on  wounds to the bilateral lower extremities.  On exam: Left leg ulcer healed Right leg wound improving beds with red granulation tissue, no odor or erythema present, periwound skin with hyperpigmented severe itching around the wound, triamcinolone applied No clinical sign of infection s/p excisional debridement Aquacel/Osbaldo/Compression garments Continue Lymphedema pump daily.

## 2023-08-15 ENCOUNTER — APPOINTMENT (OUTPATIENT)
Dept: ENDOCRINOLOGY | Facility: CLINIC | Age: 78
End: 2023-08-15
Payer: COMMERCIAL

## 2023-08-15 VITALS
DIASTOLIC BLOOD PRESSURE: 60 MMHG | SYSTOLIC BLOOD PRESSURE: 132 MMHG | HEART RATE: 91 BPM | WEIGHT: 200 LBS | OXYGEN SATURATION: 99 % | HEIGHT: 67.91 IN | BODY MASS INDEX: 30.67 KG/M2

## 2023-08-15 PROCEDURE — 99214 OFFICE O/P EST MOD 30 MIN: CPT

## 2023-08-15 NOTE — HISTORY OF PRESENT ILLNESS
[FreeTextEntry1] : Mr. RONNY NAVARRETE is 78 year old man with history of Type 2 DM diagnosed  He was following with Dr. Fernández but Dr. Fernández no longer takes his insurance therefore switching.  He was diagnosed with Type 2 DM for about 2012.   Past medication for DM: He doesn't recall completely  He is currently on metformin 1000mg bid but he stopped the medication because it ran out He was started on Farxiga 5mg once daily  He monitor his glucose twice a week. Usulaly in 130s.  This is fasting before eating.  His A1C is 6.5% Dec 2022.  He is up to date with his eye doctor.  He was having itching on his leg and he had ulcers.   He was working in the garden in the summer time and he was scratching a lot.  It is currently bandaged.  Patient had prior follow-up with vascular and wound care.  Last seen by wound care about 1-1/2 years ago. Doing better now.   Regarding hyperlipidemia, currently taking atorvastatin 20 mg once daily.  Regarding hypothyroidism, currently taking levothyroxine 75 mcg once daily.  Reporting the medication the morning with an empty stomach.  Waiting at least 30 minutes prior to eating.  B: Usually has soup for breakfast, chicken or carrot soup.  S: most of the time no snack between breakfast and dinner L: Sometimes skips lunch, if he does eat lunch, usually have fruits, apple, pear or plum D: A little rice, salad.     Lives at home with his wife.   He is a retired .  He taught science.   He states that he does not have a history of hypertension.  He denies any history of hyperlipidemia.   He follows up with ophthalmologist once a year, he states that he has no history of diabetic retinoathy.   He follows up with podiatrist as well.    In Aug 2023, he had a pacemaker done.  Blood pressure gotten normal.  When he takes the metformin, he reprots very poor appetite.  So, he stopped taking the metformin.  He states that his swinging and so had eased up.  He managed an appointment with me to discuss it.

## 2023-08-15 NOTE — ASSESSMENT
[FreeTextEntry1] : Mr. RONNY NAVARRETE is a 78 year old man with history of Type 2 DM, here to establish care, A1C at goal of 6.6% today, with CKD stage 3A, non known hypertension, no known hyperlipdiemia, hypothyroidism, here to establish endocrine visit.  He was following up with Dr. Fernández but transferring care here as Dr. Fernández is no longer on his insurance network.    1.  Type 2 diabetes mellitus A1c 6.3% in July 2023 Recently was having dizzy spell with metformin CGM vikram 3 placed by primary care doctor.  Currently only taking Farxiga 10 mg once daily. Glucose mostly at goal. Therefore, recommend patient to continue with his current regimen. DM regimen: -Farxiga 10 mg once daily  Given hypoglycemia, may be beneficial to get vikram 3 sensor. Counseled patient on some of the side effects of SGLT2 inhibitor including dehydration, vaginal yeast infections, yeast infections of the penis, ketoacidosis, as well as possible necrotizing fasciitis, a rare but serious bacterial infection to cause damage to the tissue under the skin in the area between and around the anus and genitals. Patient is up-to-date with his ophthalmologist. Patient is up-to-date with his podiatrist Foot exam is done today.  Notable for increased calluses bilaterally. Encourage patient to check glucose at least once daily, fasting and 2 hour post-prandially.   2.  Hypertension Patient states that he has no prior history of high blood pressure. BP goal <130/80 Continue with Farxiga 10 mg once daily for renal protection. Check albumin/creatinine ratio annually.  3.  Cholesterol management LDL goal <70 mg/dL. Continue with statin therapy. Currently taking atorvastatin 20 mg once daily  4.  Hypothyroidism Patient is currently on levothyroxine 75 mcg once daily. We will check TFT today  Repeat annual blood work.

## 2023-08-16 ENCOUNTER — APPOINTMENT (OUTPATIENT)
Dept: WOUND CARE | Facility: CLINIC | Age: 78
End: 2023-08-16
Payer: COMMERCIAL

## 2023-08-16 ENCOUNTER — OUTPATIENT (OUTPATIENT)
Dept: OUTPATIENT SERVICES | Facility: HOSPITAL | Age: 78
LOS: 1 days | End: 2023-08-16
Payer: COMMERCIAL

## 2023-08-16 VITALS
RESPIRATION RATE: 16 BRPM | DIASTOLIC BLOOD PRESSURE: 67 MMHG | TEMPERATURE: 97.3 F | HEART RATE: 74 BPM | SYSTOLIC BLOOD PRESSURE: 147 MMHG | OXYGEN SATURATION: 98 %

## 2023-08-16 DIAGNOSIS — Z98.890 OTHER SPECIFIED POSTPROCEDURAL STATES: Chronic | ICD-10-CM

## 2023-08-16 DIAGNOSIS — L29.9 PRURITUS, UNSPECIFIED: ICD-10-CM

## 2023-08-16 PROCEDURE — 99214 OFFICE O/P EST MOD 30 MIN: CPT

## 2023-08-16 PROCEDURE — G0463: CPT

## 2023-08-16 RX ORDER — TRIAMCINOLONE ACETONIDE 1 MG/G
0.1 OINTMENT TOPICAL
Qty: 30 | Refills: 0 | Status: ACTIVE | COMMUNITY
Start: 2023-08-16 | End: 1900-01-01

## 2023-08-16 NOTE — PLAN
[FreeTextEntry1] : 03/20/2023 Plan: Apply lidocaine or topical anesthetic if needed to reduce pain upon washing the wound. Wash wound with Dove skin sensitive soap and clean water when showering Wash wounds with Vashe and pat dry before dressing applied.  new supplies sent in so pt. can have appropriate supplies Apply Adaptic/Xtrasorb dressing. Apply multi-layer compression bandage ACE bandage Change dressing every other day Written instructions for dressing change given to patient. Leg elevation as tolerated order lymphedema pump  4/24/23 Plan: Apply Drawtex/Osbaldo/Circaids Change every other day Leg elevation as tolerated RTO in two weeks Continue using lymphedema pump twice daily Encouraged ambulation or exercise. Wound supplies ordered via DME Patient given contact information to DME  06/05/2023 s/p excisional debridement Apply Drawtex/Osbaldo/Circaids Change every other day Leg elevation as tolerated RTO in two weeks Continue using lymphedema pump twice daily Encouraged ambulation or exercise. Wound supplies ordered via DME Patient given contact information to DME  6/12/23 Plan: Apply Drawtex/Osbaldo/Circaids Change every other day Leg elevation as tolerated RTO in two weeks Continue using lymphedema pump twice daily Encouraged ambulation or exercise.  07/03/2023 Apply Drawtex/Osbaldo/Circaids Change every other day Leg elevation as tolerated RTO in two weeks Continue using lymphedema pump twice daily Encouraged ambulation or exercise.  7/10/23 Apply Drawtex/Osbaldo/Circaids Change every other day Leg elevation as tolerated RTO in one week Continue using lymphedema pump twice daily Encouraged ambulation or exercise.  7/19/23 Apply Aquacel/Osbaldo/Compression garments Change every other day Leg elevation as tolerated Continue Lymphedema pump twice daily Encourage exercise and ambulation RTO in one week   07/24/2023 Apply Aquacel/Osbaldo/Compression garments Change every other day Leg elevation as tolerated Continue Lymphedema pump twice daily Encourage exercise and ambulation RTO in one week  08/02/2023 Apply Aquacel/Osbaldo/Compression garments Change every other day Leg elevation as tolerated Continue Lymphedema pump twice daily Encourage exercise and ambulation RTO in one week  8-16-23: Plan: wash  with soap and water cont aquacel/osbaldo/compression garments  pruritis triamcinolone to hans wound and affected area.  rx sent to pharmacy measured for garments.  rx given  p/u ammonium lactate cont pump f/u 1 week

## 2023-08-16 NOTE — ASSESSMENT
[FreeTextEntry1] : Wound Assessment and Plan:  The patient presents with a wound to the bilateral lower extremities.  Swelling noted to the extremities.   Vascular procedures planned with Dr. Morrison in March. Bilateral palpable pulses. Moderate edema. Wound beds are red with large amount of yellow serous drainage.LLE wound with periwound erythema. Wounds with increased drainage and pain. Dressing changed to Adaptic and super absorber/Osbaldo/ACE wraps. s/p excisional debridement Compression garments ordered.   2/22/23 bilateral leg wounds, right leg warm to touch, slight tenderness, has been using shilpi, telfa over wounds, incorrect supplies ordered, has not received wrap yet s/p excisional debridement He read the [facility] consent and signed it prior to the initiation of any screening procedures.  He had the opportunity to discuss any questions regarding the study.  I witnessed the signing of the consent and the patient was given a copy of the signed consent.   03/20/2023 bilateral leg wounds, right leg warm to touch, slight tenderness, has been using shilpi, adaptic over wounds patient received circaid wraps s/p excisional debridement Patient underwent evaluation for peripheral arterial disease using a manual procedure  Ankle brachial index was obtained using blood pressure cuffs of the ankle and brachial segments of both legs.  The procedure was supervised and interpreted by the physician.  MAUDE of the right lower extremity 1.17.   MAUDE of the left lower extremity 1.1.  Waveform noted to be (triphasic).   Patient tolerated procedure well in the office.  Results discussed with the patient. Lower extremity wounds dressed with Adaptic/Drawtex/Osbaldo Patient educated by nurse in the proper use of Circaid compression garments Circaids placed on patient today Verified with company supplies to ship on March 23 RTO to office in two weeks chronic lymphedema - has pump very old and not working  4/24/23 Wound Assessment and Plan: The patient presents with a wounds to the bilateral lower extremities.  Minimal swelling noted to the extremities.   On exam: Wound beds with red granulation tissue, thin layer of yellow slough, no odor or erythema present, periwound skin dry and scaly No clinical sign of infection Recommendation: Apply lidocaine or topical anesthetic if needed to reduce pain upon washing the wound. Wash wound with Dove skin sensitive soap and clean water  Apply Drawtex to the wound beds/Osbaldo/Circaids Change dressing --every other day Leg elevation as tolerated Encouraged ambulation or exercise. Optimization of nutrition. Continue using Lymphedema pump twice daily Wound supplies ordered via DME Patient given contact information to DME Follow up appointment scheduled for two weeks  06/05/2023 Wound Assessment and Plan: The patient presents with a wounds to the bilateral lower extremities.  swelling noted to the extremities.  pt complaints of increased pain at night and when ambulating On exam: Wound beds with red granulation tissue, thin layer of yellow slough, no odor or erythema present, periwound skin dry and scaly No clinical sign of infection s/p excisional debridement Dynaflex will be applied today / The patient was positioned to allow for optimization of imaging. The fluorescence imaging device was placed 8-12 cm from the patient and the clinical darkness required for imaging was obtained by a dark drape. The wound measured () on the () . The [employer] i:X fluorescence imaging device was used to report presence and location of cyan fluorescence, indicative of bacteria at loads greater than 104 CFU/g in real-time. Images reported to have cyan fluorescence. The wound was mechanically cleansed with Dakins 0.125% and underwent excisional debridement. Fluorescence images acquired after cleansing demonstrated reduction in bacteria.  Patient enrolled in affinity study, signed the consents  6/12/23 The patient presents with a wounds to the bilateral lower extremities. Swelling noted to the extremities. Patient reports a decrease in pain and drainage. On exam: Wound beds with red granulation tissue, thin layer of yellow slough, no odor or erythema present, periwound skin dry and scaly No clinical sign of infection s/p excisional debridement Drawtex/Osbaldo/ACE wraps Will continue to use daily compression garments Lymphedema pump daily. The patient was positioned to allow for optimization of imaging. The fluorescence imaging device was placed 8-12 cm from the patient and the clinical darkness required for imaging was obtained by a dark drape. The wound measured see TA. The [employer] i:X fluorescence imaging device was used to report presence and location of cyan fluorescence, indicative of bacteria at loads greater than 104 CFU/g in real-time. Images reported to have cyan fluorescence. The wound was mechanically cleansed with Vashe and underwent excisional debridement. Fluorescence images acquired after cleansing demonstrated reduction in bacteria.  Patient enrolled in affinity study, signed the consents     07/03/2023 The patient presents with a wounds to the bilateral lower extremities. Swelling noted to the extremities. Patient reports a decrease in  drainage. Today being randomized for Affinity study On exam: Wound beds with red granulation tissue, thin layer of yellow slough, no odor or erythema present, periwound skin dry and scaly No clinical sign of infection s/p excisional debridement Drawtex/Osbaldo/ACE wraps Will continue to use daily compression garments Lymphedema pump daily.  7/10/23 The patient presents with a wounds to the bilateral lower extremities. Patient reports a decrease in drainage. Patient participating in the Affinity study. On exam: Wound beds with red granulation tissue, thin layer of yellow slough, no odor or erythema present, periwound skin  No clinical sign of infection s/p excisional debridement Drawtex/Osbaldo/ACE wraps Will continue to use daily compression garments Lymphedema pump daily.  7/19/23 The patient presents with a wounds to the bilateral lower extremities. Patient reports a decrease in drainage.  On exam: Wounds improving beds with red granulation tissue, no odor or erythema present, periwound skin with hyperpigmented No clinical sign of infection s/p mechanical debridement Aquacel/Osbaldo/Compression garments Continue Lymphedema pump daily.  07/24/2023 The patient presents with a follow up on  wounds to the bilateral lower extremities. Patient reports a decrease in drainage.  On exam: Left leg ulcer healed Right leg wound improving beds with red granulation tissue, no odor or erythema present, periwound skin with hyperpigmented No clinical sign of infection s/p excisional debridement Aquacel/Osbaldo/Compression garments Continue Lymphedema pump daily.  08/02/2023 The patient presents with a follow up on  wounds to the bilateral lower extremities.  On exam: Left leg ulcer healed Right leg wound improving beds with red granulation tissue, no odor or erythema present, periwound skin with hyperpigmented No clinical sign of infection s/p excisional debridement Aquacel/Osbaldo/Compression garments Continue Lymphedema pump daily.  08/07/2023 The patient presents with a follow up on  wounds to the bilateral lower extremities.  On exam: Left leg ulcer healed Right leg wound improving beds with red granulation tissue, no odor or erythema present, periwound skin with hyperpigmented severe itching around the wound, triamcinolone applied No clinical sign of infection s/p excisional debridement Aquacel/Osbaldo/Compression garments Continue Lymphedema pump daily.  8-16-23: Pt here for f/u No new complaints.. Still with itching.  Did not get the ammonium lactate using pump On exam:  LLE: healed, dry skin No s/s of infection.  RLE:  serous crusted material in wound bed.  s/p mechanical debridement No s/s of infection.

## 2023-08-16 NOTE — PHYSICAL EXAM
[2+] : right 2+ [Ankle Swelling (On Exam)] : present [Ankle Swelling Bilaterally] : bilaterally  [Ankle Swelling On The Right] : mild [] : bilaterally [Skin Ulcer] : ulcer [Alert] : alert [Oriented to Person] : oriented to person [Oriented to Place] : oriented to place [Oriented to Time] : oriented to time [Calm] : calm [de-identified] : NAD obese [de-identified] : AT [de-identified] : supple [de-identified] : equal chest rise [Please See PDF for [employer]] : Please See PDF for [employer].

## 2023-08-17 RX ORDER — AMMONIUM LACTATE 12 %
12 CREAM (GRAM) TOPICAL TWICE DAILY
Qty: 1 | Refills: 0 | Status: ACTIVE | COMMUNITY
Start: 2023-08-07 | End: 1900-01-01

## 2023-08-18 ENCOUNTER — APPOINTMENT (OUTPATIENT)
Dept: ELECTROPHYSIOLOGY | Facility: CLINIC | Age: 78
End: 2023-08-18
Payer: COMMERCIAL

## 2023-08-18 ENCOUNTER — NON-APPOINTMENT (OUTPATIENT)
Age: 78
End: 2023-08-18

## 2023-08-18 VITALS
HEART RATE: 63 BPM | SYSTOLIC BLOOD PRESSURE: 144 MMHG | HEIGHT: 67.91 IN | DIASTOLIC BLOOD PRESSURE: 77 MMHG | WEIGHT: 203 LBS | BODY MASS INDEX: 31.12 KG/M2 | OXYGEN SATURATION: 96 %

## 2023-08-18 PROCEDURE — 93279 PRGRMG DEV EVAL PM/LDLS PM: CPT

## 2023-08-18 PROCEDURE — 99213 OFFICE O/P EST LOW 20 MIN: CPT

## 2023-08-18 PROCEDURE — 93000 ELECTROCARDIOGRAM COMPLETE: CPT

## 2023-08-18 NOTE — DISCUSSION/SUMMARY
[EKG obtained to assist in diagnosis and management of assessed problem(s)] : EKG obtained to assist in diagnosis and management of assessed problem(s) [FreeTextEntry1] : Impression:  1. Permanent afib: now s/p MICRA placement. EKG performed today to assess for presence of conduction disease and reveals afib, ventricular pacing. Resume routine device checks as scheduled. Pacing and sensing was normal on device check today.  Resume Eliquis for thromboembolic prophylaxis.   2. Hypothyroid: resume levothyroxine as prescribed and regular f/u with PCP for routine TFT monitoring and management.  3. HLD: resume statin therapy as prescribed and regular f/u with Cardiologist for routine lipid monitoring and management.  Will continue f/u with Cardiologist and may RTO as needed or if any new or worsening symptoms or findings occur.

## 2023-08-18 NOTE — HISTORY OF PRESENT ILLNESS
[FreeTextEntry1] : Tyler Mendenhall is a 79y/o man with Hx of HLD, hypothyroid, DMII, permanent atrial fibrillation on Eliquis s/p DCCV in 2014 and now s/p MICRA placement on 8/2/2023 who presents today for routine f/u. Has now been feeling well since pacemaker placement. Feels less fatigued and now able to walk longer distances without dyspnea. Was struggling with hypotension, now has a Chau for continuous blood glucose monitoring. Denies chest pain, palpitations, SOB, syncope or near syncope. Patient was also having lightheadedness, possibly after taking metformin which was discontinued, and he has felt better.

## 2023-08-21 ENCOUNTER — TRANSCRIPTION ENCOUNTER (OUTPATIENT)
Age: 78
End: 2023-08-21

## 2023-08-23 ENCOUNTER — OUTPATIENT (OUTPATIENT)
Dept: OUTPATIENT SERVICES | Facility: HOSPITAL | Age: 78
LOS: 1 days | End: 2023-08-23
Payer: COMMERCIAL

## 2023-08-23 ENCOUNTER — APPOINTMENT (OUTPATIENT)
Dept: WOUND CARE | Facility: CLINIC | Age: 78
End: 2023-08-23
Payer: COMMERCIAL

## 2023-08-23 VITALS
SYSTOLIC BLOOD PRESSURE: 161 MMHG | RESPIRATION RATE: 18 BRPM | HEART RATE: 70 BPM | HEIGHT: 67 IN | OXYGEN SATURATION: 98 % | BODY MASS INDEX: 31.86 KG/M2 | DIASTOLIC BLOOD PRESSURE: 80 MMHG | TEMPERATURE: 97 F | WEIGHT: 203 LBS

## 2023-08-23 DIAGNOSIS — L97.919 VARICOSE VEINS OF RIGHT LOWER EXTREMITY WITH ULCER OF UNSPECIFIED SITE: ICD-10-CM

## 2023-08-23 DIAGNOSIS — I83.019 VARICOSE VEINS OF RIGHT LOWER EXTREMITY WITH ULCER OF UNSPECIFIED SITE: ICD-10-CM

## 2023-08-23 DIAGNOSIS — Z98.890 OTHER SPECIFIED POSTPROCEDURAL STATES: Chronic | ICD-10-CM

## 2023-08-23 PROCEDURE — 11042 DBRDMT SUBQ TIS 1ST 20SQCM/<: CPT

## 2023-08-23 NOTE — PLAN
[FreeTextEntry1] : 03/20/2023 Plan: Apply lidocaine or topical anesthetic if needed to reduce pain upon washing the wound. Wash wound with Dove skin sensitive soap and clean water when showering Wash wounds with Vashe and pat dry before dressing applied.  new supplies sent in so pt. can have appropriate supplies Apply Adaptic/Xtrasorb dressing. Apply multi-layer compression bandage ACE bandage Change dressing every other day Written instructions for dressing change given to patient. Leg elevation as tolerated order lymphedema pump  4/24/23 Plan: Apply Drawtex/Osbaldo/Circaids Change every other day Leg elevation as tolerated RTO in two weeks Continue using lymphedema pump twice daily Encouraged ambulation or exercise. Wound supplies ordered via DME Patient given contact information to DME  06/05/2023 s/p excisional debridement Apply Drawtex/Osbaldo/Circaids Change every other day Leg elevation as tolerated RTO in two weeks Continue using lymphedema pump twice daily Encouraged ambulation or exercise. Wound supplies ordered via DME Patient given contact information to DME  6/12/23 Plan: Apply Drawtex/Osbaldo/Circaids Change every other day Leg elevation as tolerated RTO in two weeks Continue using lymphedema pump twice daily Encouraged ambulation or exercise.  07/03/2023 Apply Drawtex/Osbaldo/Circaids Change every other day Leg elevation as tolerated RTO in two weeks Continue using lymphedema pump twice daily Encouraged ambulation or exercise.  7/10/23 Apply Drawtex/Osbaldo/Circaids Change every other day Leg elevation as tolerated RTO in one week Continue using lymphedema pump twice daily Encouraged ambulation or exercise.  7/19/23 Apply Aquacel/Osbaldo/Compression garments Change every other day Leg elevation as tolerated Continue Lymphedema pump twice daily Encourage exercise and ambulation RTO in one week   07/24/2023 Apply Aquacel/Osbaldo/Compression garments Change every other day Leg elevation as tolerated Continue Lymphedema pump twice daily Encourage exercise and ambulation RTO in one week  08/02/2023 Apply Aquacel/Osbaldo/Compression garments Change every other day Leg elevation as tolerated Continue Lymphedema pump twice daily Encourage exercise and ambulation RTO in one week  8-16-23: Plan: wash  with soap and water cont aquacel/osbaldo/compression garments  pruritis triamcinolone to hans wound and affected area.  rx sent to pharmacy measured for garments.  rx given  p/u ammonium lactate cont pump f/u 1 week   8/23/23 Plan: Wash with soap and water Moisturize skin cont aquacel/osbaldo/compression garments  1 additional week of steroid cream for itching Re-measured for compression garments;  rx given Follow up in 1 week (Affinity study)

## 2023-08-23 NOTE — ASSESSMENT
[FreeTextEntry1] : Wound Assessment and Plan:  The patient presents with a wound to the bilateral lower extremities.  Swelling noted to the extremities.   Vascular procedures planned with Dr. Morrison in March. Bilateral palpable pulses. Moderate edema. Wound beds are red with large amount of yellow serous drainage.LLE wound with periwound erythema. Wounds with increased drainage and pain. Dressing changed to Adaptic and super absorber/Osbaldo/ACE wraps. s/p excisional debridement Compression garments ordered.   2/22/23 bilateral leg wounds, right leg warm to touch, slight tenderness, has been using shilpi, telfa over wounds, incorrect supplies ordered, has not received wrap yet s/p excisional debridement He read the [facility] consent and signed it prior to the initiation of any screening procedures.  He had the opportunity to discuss any questions regarding the study.  I witnessed the signing of the consent and the patient was given a copy of the signed consent.   03/20/2023 bilateral leg wounds, right leg warm to touch, slight tenderness, has been using shilpi, adaptic over wounds patient received circaid wraps s/p excisional debridement Patient underwent evaluation for peripheral arterial disease using a manual procedure  Ankle brachial index was obtained using blood pressure cuffs of the ankle and brachial segments of both legs.  The procedure was supervised and interpreted by the physician.  MAUDE of the right lower extremity 1.17.   MAUDE of the left lower extremity 1.1.  Waveform noted to be (triphasic).   Patient tolerated procedure well in the office.  Results discussed with the patient. Lower extremity wounds dressed with Adaptic/Drawtex/Osbaldo Patient educated by nurse in the proper use of Circaid compression garments Circaids placed on patient today Verified with company supplies to ship on March 23 RTO to office in two weeks chronic lymphedema - has pump very old and not working  4/24/23 Wound Assessment and Plan: The patient presents with a wounds to the bilateral lower extremities.  Minimal swelling noted to the extremities.   On exam: Wound beds with red granulation tissue, thin layer of yellow slough, no odor or erythema present, periwound skin dry and scaly No clinical sign of infection Recommendation: Apply lidocaine or topical anesthetic if needed to reduce pain upon washing the wound. Wash wound with Dove skin sensitive soap and clean water  Apply Drawtex to the wound beds/Osbaldo/Circaids Change dressing --every other day Leg elevation as tolerated Encouraged ambulation or exercise. Optimization of nutrition. Continue using Lymphedema pump twice daily Wound supplies ordered via DME Patient given contact information to DME Follow up appointment scheduled for two weeks  06/05/2023 Wound Assessment and Plan: The patient presents with a wounds to the bilateral lower extremities.  swelling noted to the extremities.  pt complaints of increased pain at night and when ambulating On exam: Wound beds with red granulation tissue, thin layer of yellow slough, no odor or erythema present, periwound skin dry and scaly No clinical sign of infection s/p excisional debridement Dynaflex will be applied today / The patient was positioned to allow for optimization of imaging. The fluorescence imaging device was placed 8-12 cm from the patient and the clinical darkness required for imaging was obtained by a dark drape. The wound measured () on the () . The britebillt i:X fluorescence imaging device was used to report presence and location of cyan fluorescence, indicative of bacteria at loads greater than 104 CFU/g in real-time. Images reported to have cyan fluorescence. The wound was mechanically cleansed with Dakins 0.125% and underwent excisional debridement. Fluorescence images acquired after cleansing demonstrated reduction in bacteria.  Patient enrolled in affinity study, signed the consents  6/12/23 The patient presents with a wounds to the bilateral lower extremities. Swelling noted to the extremities. Patient reports a decrease in pain and drainage. On exam: Wound beds with red granulation tissue, thin layer of yellow slough, no odor or erythema present, periwound skin dry and scaly No clinical sign of infection s/p excisional debridement Drawtex/Osbaldo/ACE wraps Will continue to use daily compression garments Lymphedema pump daily. The patient was positioned to allow for optimization of imaging. The fluorescence imaging device was placed 8-12 cm from the patient and the clinical darkness required for imaging was obtained by a dark drape. The wound measured see TA. The britebillt i:X fluorescence imaging device was used to report presence and location of cyan fluorescence, indicative of bacteria at loads greater than 104 CFU/g in real-time. Images reported to have cyan fluorescence. The wound was mechanically cleansed with Vashe and underwent excisional debridement. Fluorescence images acquired after cleansing demonstrated reduction in bacteria.  Patient enrolled in affinity study, signed the consents     07/03/2023 The patient presents with a wounds to the bilateral lower extremities. Swelling noted to the extremities. Patient reports a decrease in  drainage. Today being randomized for Affinity study On exam: Wound beds with red granulation tissue, thin layer of yellow slough, no odor or erythema present, periwound skin dry and scaly No clinical sign of infection s/p excisional debridement Drawtex/Osbaldo/ACE wraps Will continue to use daily compression garments Lymphedema pump daily.  7/10/23 The patient presents with a wounds to the bilateral lower extremities. Patient reports a decrease in drainage. Patient participating in the Affinity study. On exam: Wound beds with red granulation tissue, thin layer of yellow slough, no odor or erythema present, periwound skin  No clinical sign of infection s/p excisional debridement Drawtex/Osbaldo/ACE wraps Will continue to use daily compression garments Lymphedema pump daily.  7/19/23 The patient presents with a wounds to the bilateral lower extremities. Patient reports a decrease in drainage.  On exam: Wounds improving beds with red granulation tissue, no odor or erythema present, periwound skin with hyperpigmented No clinical sign of infection s/p mechanical debridement Aquacel/Osbaldo/Compression garments Continue Lymphedema pump daily.  07/24/2023 The patient presents with a follow up on  wounds to the bilateral lower extremities. Patient reports a decrease in drainage.  On exam: Left leg ulcer healed Right leg wound improving beds with red granulation tissue, no odor or erythema present, periwound skin with hyperpigmented No clinical sign of infection s/p excisional debridement Aquacel/Osbaldo/Compression garments Continue Lymphedema pump daily.  08/02/2023 The patient presents with a follow up on  wounds to the bilateral lower extremities.  On exam: Left leg ulcer healed Right leg wound improving beds with red granulation tissue, no odor or erythema present, periwound skin with hyperpigmented No clinical sign of infection s/p excisional debridement Aquacel/Osbaldo/Compression garments Continue Lymphedema pump daily.  08/07/2023 The patient presents with a follow up on  wounds to the bilateral lower extremities.  On exam: Left leg ulcer healed Right leg wound improving beds with red granulation tissue, no odor or erythema present, periwound skin with hyperpigmented severe itching around the wound, triamcinolone applied No clinical sign of infection s/p excisional debridement Aquacel/Osbaldo/Compression garments Continue Lymphedema pump daily.  8-16-23: Pt here for f/u No new complaints.. Still with itching.  Did not get the ammonium lactate using pump On exam:  LLE: healed, dry skin No s/s of infection.  RLE:  serous crusted material in wound bed.  s/p mechanical debridement No s/s of infection.   8/23/23 Patient here for follow up of bilateral leg wounds - left leg remains healed.  Reports no fevers, no new complaints. States the topical steroid has helped with the itching. Got moisturizer 2 days ago, still with some skin dryness.  On exam: LLE: healed with dry skin. No erythema or signs of infection RLE: small amount of serous crust  in wound bed. S/p excisional debridement. No erythema, purulence, or signs of infection.

## 2023-08-23 NOTE — PHYSICAL EXAM
[Ankle Swelling (On Exam)] : present [Ankle Swelling Bilaterally] : bilaterally  [Ankle Swelling On The Right] : mild [] : bilaterally [Skin Ulcer] : ulcer [Alert] : alert [Oriented to Person] : oriented to person [Oriented to Place] : oriented to place [Oriented to Time] : oriented to time [Calm] : calm [Please See PDF for Tissue Analytics] : Please See PDF for Tissue Analytics. [2+] : left 2+ [Skin Induration] : no induration [de-identified] : NAD, obese [de-identified] : supple [de-identified] : AT [de-identified] : equal chest rise [de-identified] : FROM

## 2023-08-28 ENCOUNTER — APPOINTMENT (OUTPATIENT)
Dept: WOUND CARE | Facility: CLINIC | Age: 78
End: 2023-08-28

## 2023-08-30 ENCOUNTER — APPOINTMENT (OUTPATIENT)
Dept: WOUND CARE | Facility: HOSPITAL | Age: 78
End: 2023-08-30

## 2023-09-06 ENCOUNTER — OUTPATIENT (OUTPATIENT)
Dept: OUTPATIENT SERVICES | Facility: HOSPITAL | Age: 78
LOS: 1 days | End: 2023-09-06
Payer: COMMERCIAL

## 2023-09-06 ENCOUNTER — APPOINTMENT (OUTPATIENT)
Dept: WOUND CARE | Facility: CLINIC | Age: 78
End: 2023-09-06
Payer: COMMERCIAL

## 2023-09-06 VITALS — HEART RATE: 83 BPM | TEMPERATURE: 97 F | SYSTOLIC BLOOD PRESSURE: 149 MMHG | DIASTOLIC BLOOD PRESSURE: 76 MMHG

## 2023-09-06 DIAGNOSIS — Z98.890 OTHER SPECIFIED POSTPROCEDURAL STATES: Chronic | ICD-10-CM

## 2023-09-06 PROCEDURE — 11042 DBRDMT SUBQ TIS 1ST 20SQCM/<: CPT

## 2023-09-06 NOTE — ASSESSMENT
[FreeTextEntry1] : Wound Assessment and Plan:  The patient presents with a wound to the bilateral lower extremities.  Swelling noted to the extremities.   Vascular procedures planned with Dr. Morrison in March. Bilateral palpable pulses. Moderate edema. Wound beds are red with large amount of yellow serous drainage.LLE wound with periwound erythema. Wounds with increased drainage and pain. Dressing changed to Adaptic and super absorber/Osbaldo/ACE wraps. s/p excisional debridement Compression garments ordered.   2/22/23 bilateral leg wounds, right leg warm to touch, slight tenderness, has been using shilpi, telfa over wounds, incorrect supplies ordered, has not received wrap yet s/p excisional debridement He read the [facility] consent and signed it prior to the initiation of any screening procedures.  He had the opportunity to discuss any questions regarding the study.  I witnessed the signing of the consent and the patient was given a copy of the signed consent.   03/20/2023 bilateral leg wounds, right leg warm to touch, slight tenderness, has been using shilpi, adaptic over wounds patient received circaid wraps s/p excisional debridement Patient underwent evaluation for peripheral arterial disease using a manual procedure  Ankle brachial index was obtained using blood pressure cuffs of the ankle and brachial segments of both legs.  The procedure was supervised and interpreted by the physician.  MAUDE of the right lower extremity 1.17.   MAUDE of the left lower extremity 1.1.  Waveform noted to be (triphasic).   Patient tolerated procedure well in the office.  Results discussed with the patient. Lower extremity wounds dressed with Adaptic/Drawtex/Osbaldo Patient educated by nurse in the proper use of Circaid compression garments Circaids placed on patient today Verified with company supplies to ship on March 23 RTO to office in two weeks chronic lymphedema - has pump very old and not working  4/24/23 Wound Assessment and Plan: The patient presents with a wounds to the bilateral lower extremities.  Minimal swelling noted to the extremities.   On exam: Wound beds with red granulation tissue, thin layer of yellow slough, no odor or erythema present, periwound skin dry and scaly No clinical sign of infection Recommendation: Apply lidocaine or topical anesthetic if needed to reduce pain upon washing the wound. Wash wound with Dove skin sensitive soap and clean water  Apply Drawtex to the wound beds/Osbaldo/Circaids Change dressing --every other day Leg elevation as tolerated Encouraged ambulation or exercise. Optimization of nutrition. Continue using Lymphedema pump twice daily Wound supplies ordered via DME Patient given contact information to DME Follow up appointment scheduled for two weeks  06/05/2023 Wound Assessment and Plan: The patient presents with a wounds to the bilateral lower extremities.  swelling noted to the extremities.  pt complaints of increased pain at night and when ambulating On exam: Wound beds with red granulation tissue, thin layer of yellow slough, no odor or erythema present, periwound skin dry and scaly No clinical sign of infection s/p excisional debridement Dynaflex will be applied today / The patient was positioned to allow for optimization of imaging. The fluorescence imaging device was placed 8-12 cm from the patient and the clinical darkness required for imaging was obtained by a dark drape. The wound measured () on the () . The SpinNotet i:X fluorescence imaging device was used to report presence and location of cyan fluorescence, indicative of bacteria at loads greater than 104 CFU/g in real-time. Images reported to have cyan fluorescence. The wound was mechanically cleansed with Dakins 0.125% and underwent excisional debridement. Fluorescence images acquired after cleansing demonstrated reduction in bacteria.  Patient enrolled in affinity study, signed the consents  6/12/23 The patient presents with a wounds to the bilateral lower extremities. Swelling noted to the extremities. Patient reports a decrease in pain and drainage. On exam: Wound beds with red granulation tissue, thin layer of yellow slough, no odor or erythema present, periwound skin dry and scaly No clinical sign of infection s/p excisional debridement Drawtex/Osbaldo/ACE wraps Will continue to use daily compression garments Lymphedema pump daily. The patient was positioned to allow for optimization of imaging. The fluorescence imaging device was placed 8-12 cm from the patient and the clinical darkness required for imaging was obtained by a dark drape. The wound measured see TA. The SpinNotet i:X fluorescence imaging device was used to report presence and location of cyan fluorescence, indicative of bacteria at loads greater than 104 CFU/g in real-time. Images reported to have cyan fluorescence. The wound was mechanically cleansed with Vashe and underwent excisional debridement. Fluorescence images acquired after cleansing demonstrated reduction in bacteria.  Patient enrolled in affinity study, signed the consents     07/03/2023 The patient presents with a wounds to the bilateral lower extremities. Swelling noted to the extremities. Patient reports a decrease in  drainage. Today being randomized for Affinity study On exam: Wound beds with red granulation tissue, thin layer of yellow slough, no odor or erythema present, periwound skin dry and scaly No clinical sign of infection s/p excisional debridement Drawtex/Osbaldo/ACE wraps Will continue to use daily compression garments Lymphedema pump daily.  7/10/23 The patient presents with a wounds to the bilateral lower extremities. Patient reports a decrease in drainage. Patient participating in the Affinity study. On exam: Wound beds with red granulation tissue, thin layer of yellow slough, no odor or erythema present, periwound skin  No clinical sign of infection s/p excisional debridement Drawtex/Osbaldo/ACE wraps Will continue to use daily compression garments Lymphedema pump daily.  7/19/23 The patient presents with a wounds to the bilateral lower extremities. Patient reports a decrease in drainage.  On exam: Wounds improving beds with red granulation tissue, no odor or erythema present, periwound skin with hyperpigmented No clinical sign of infection s/p mechanical debridement Aquacel/Osbaldo/Compression garments Continue Lymphedema pump daily.  07/24/2023 The patient presents with a follow up on  wounds to the bilateral lower extremities. Patient reports a decrease in drainage.  On exam: Left leg ulcer healed Right leg wound improving beds with red granulation tissue, no odor or erythema present, periwound skin with hyperpigmented No clinical sign of infection s/p excisional debridement Aquacel/Osbaldo/Compression garments Continue Lymphedema pump daily.  08/02/2023 The patient presents with a follow up on  wounds to the bilateral lower extremities.  On exam: Left leg ulcer healed Right leg wound improving beds with red granulation tissue, no odor or erythema present, periwound skin with hyperpigmented No clinical sign of infection s/p excisional debridement Aquacel/Osbaldo/Compression garments Continue Lymphedema pump daily.  08/07/2023 The patient presents with a follow up on  wounds to the bilateral lower extremities.  On exam: Left leg ulcer healed Right leg wound improving beds with red granulation tissue, no odor or erythema present, periwound skin with hyperpigmented severe itching around the wound, triamcinolone applied No clinical sign of infection s/p excisional debridement Aquacel/Osbaldo/Compression garments Continue Lymphedema pump daily.  8-16-23: Pt here for f/u No new complaints.. Still with itching.  Did not get the ammonium lactate using pump On exam:  LLE: healed, dry skin No s/s of infection.  RLE:  serous crusted material in wound bed.  s/p mechanical debridement No s/s of infection.   8/23/23 Patient here for follow up of bilateral leg wounds - left leg remains healed.  Reports no fevers, no new complaints. States the topical steroid has helped with the itching. Got moisturizer 2 days ago, still with some skin dryness.  On exam: LLE: healed with dry skin. No erythema or signs of infection RLE: small amount of serous crust  in wound bed. S/p excisional debridement. No erythema, purulence, or signs of infection.  9/6/23 Patient here for follow up of bilateral leg wounds. No fevers, no new complaints. On exam: Left leg has flaky skin, has had some drainage, still appears healed. RLE with small amount of slough in the wound bed. Excisional debridement performed. No signs of infection

## 2023-09-06 NOTE — PLAN
[FreeTextEntry1] : 03/20/2023 Plan: Apply lidocaine or topical anesthetic if needed to reduce pain upon washing the wound. Wash wound with Dove skin sensitive soap and clean water when showering Wash wounds with Vashe and pat dry before dressing applied.  new supplies sent in so pt. can have appropriate supplies Apply Adaptic/Xtrasorb dressing. Apply multi-layer compression bandage ACE bandage Change dressing every other day Written instructions for dressing change given to patient. Leg elevation as tolerated order lymphedema pump  4/24/23 Plan: Apply Drawtex/Osbaldo/Circaids Change every other day Leg elevation as tolerated RTO in two weeks Continue using lymphedema pump twice daily Encouraged ambulation or exercise. Wound supplies ordered via DME Patient given contact information to DME  06/05/2023 s/p excisional debridement Apply Drawtex/Osbaldo/Circaids Change every other day Leg elevation as tolerated RTO in two weeks Continue using lymphedema pump twice daily Encouraged ambulation or exercise. Wound supplies ordered via DME Patient given contact information to DME  6/12/23 Plan: Apply Drawtex/Osbaldo/Circaids Change every other day Leg elevation as tolerated RTO in two weeks Continue using lymphedema pump twice daily Encouraged ambulation or exercise.  07/03/2023 Apply Drawtex/Osbaldo/Circaids Change every other day Leg elevation as tolerated RTO in two weeks Continue using lymphedema pump twice daily Encouraged ambulation or exercise.  7/10/23 Apply Drawtex/Osbaldo/Circaids Change every other day Leg elevation as tolerated RTO in one week Continue using lymphedema pump twice daily Encouraged ambulation or exercise.  7/19/23 Apply Aquacel/Osbaldo/Compression garments Change every other day Leg elevation as tolerated Continue Lymphedema pump twice daily Encourage exercise and ambulation RTO in one week   07/24/2023 Apply Aquacel/Osbaldo/Compression garments Change every other day Leg elevation as tolerated Continue Lymphedema pump twice daily Encourage exercise and ambulation RTO in one week  08/02/2023 Apply Aquacel/Osbaldo/Compression garments Change every other day Leg elevation as tolerated Continue Lymphedema pump twice daily Encourage exercise and ambulation RTO in one week  8-16-23: Plan: wash  with soap and water cont aquacel/osbaldo/compression garments  pruritis triamcinolone to hans wound and affected area.  rx sent to pharmacy measured for garments.  rx given  p/u ammonium lactate cont pump f/u 1 week   8/23/23 Plan: Wash with soap and water Moisturize skin cont aquacel/osbaldo/compression garments  1 additional week of steroid cream for itching Re-measured for compression garments;  rx given Follow up in 1 week (Affinity study)  9/6/23 Plan: Re-measured for compression garments - 30-40mmHg needed Today, RLE shilpi to wound, extrasorb, multilayer dressing LLE multilayer dressing Once pt has appropriate compression garments, may do shilpi, extrasorb, osbaldo, compression Follow up in 1 week

## 2023-09-06 NOTE — PHYSICAL EXAM
[2+] : left 2+ [Ankle Swelling (On Exam)] : present [Ankle Swelling Bilaterally] : bilaterally  [Ankle Swelling On The Right] : mild [] : bilaterally [Skin Ulcer] : ulcer [Alert] : alert [Oriented to Person] : oriented to person [Oriented to Place] : oriented to place [Oriented to Time] : oriented to time [Calm] : calm [Please See PDF for Tissue Analytics] : Please See PDF for Tissue Analytics. [Skin Induration] : no induration [de-identified] : NAD, obese [de-identified] : AT [de-identified] : supple [de-identified] : equal chest rise [de-identified] : FROM

## 2023-09-07 DIAGNOSIS — L97.929 NON-PRESSURE CHRONIC ULCER OF UNSPECIFIED PART OF LEFT LOWER LEG WITH UNSPECIFIED SEVERITY: ICD-10-CM

## 2023-09-07 DIAGNOSIS — L97.919 NON-PRESSURE CHRONIC ULCER OF UNSPECIFIED PART OF RIGHT LOWER LEG WITH UNSPECIFIED SEVERITY: ICD-10-CM

## 2023-09-07 DIAGNOSIS — I87.333 CHRONIC VENOUS HYPERTENSION (IDIOPATHIC) WITH ULCER AND INFLAMMATION OF BILATERAL LOWER EXTREMITY: ICD-10-CM

## 2023-09-11 ENCOUNTER — APPOINTMENT (OUTPATIENT)
Dept: WOUND CARE | Facility: CLINIC | Age: 78
End: 2023-09-11

## 2023-09-11 DIAGNOSIS — L97.919 NON-PRESSURE CHRONIC ULCER OF UNSPECIFIED PART OF RIGHT LOWER LEG WITH UNSPECIFIED SEVERITY: ICD-10-CM

## 2023-09-11 DIAGNOSIS — L20.9 ATOPIC DERMATITIS, UNSPECIFIED: ICD-10-CM

## 2023-09-11 DIAGNOSIS — I83.019 VARICOSE VEINS OF RIGHT LOWER EXTREMITY WITH ULCER OF UNSPECIFIED SITE: ICD-10-CM

## 2023-09-13 ENCOUNTER — OUTPATIENT (OUTPATIENT)
Dept: OUTPATIENT SERVICES | Facility: HOSPITAL | Age: 78
LOS: 1 days | End: 2023-09-13
Payer: MEDICARE

## 2023-09-13 ENCOUNTER — APPOINTMENT (OUTPATIENT)
Dept: WOUND CARE | Facility: HOSPITAL | Age: 78
End: 2023-09-13
Payer: COMMERCIAL

## 2023-09-13 VITALS
TEMPERATURE: 97.4 F | OXYGEN SATURATION: 99 % | HEART RATE: 63 BPM | BODY MASS INDEX: 27.63 KG/M2 | SYSTOLIC BLOOD PRESSURE: 164 MMHG | RESPIRATION RATE: 17 BRPM | WEIGHT: 193 LBS | DIASTOLIC BLOOD PRESSURE: 76 MMHG | HEIGHT: 70 IN

## 2023-09-13 DIAGNOSIS — Z98.890 OTHER SPECIFIED POSTPROCEDURAL STATES: Chronic | ICD-10-CM
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
## 2023-09-13 DIAGNOSIS — T14.90XA INJURY, UNSPECIFIED, INITIAL ENCOUNTER: ICD-10-CM

## 2023-09-13 PROCEDURE — 99213 OFFICE O/P EST LOW 20 MIN: CPT

## 2023-09-13 PROCEDURE — G0463: CPT

## 2023-09-14 DIAGNOSIS — L97.919 NON-PRESSURE CHRONIC ULCER OF UNSPECIFIED PART OF RIGHT LOWER LEG WITH UNSPECIFIED SEVERITY: ICD-10-CM

## 2023-09-14 DIAGNOSIS — I83.019 VARICOSE VEINS OF RIGHT LOWER EXTREMITY WITH ULCER OF UNSPECIFIED SITE: ICD-10-CM

## 2023-09-18 ENCOUNTER — APPOINTMENT (OUTPATIENT)
Dept: WOUND CARE | Facility: CLINIC | Age: 78
End: 2023-09-18

## 2023-09-18 DIAGNOSIS — L97.919 NON-PRESSURE CHRONIC ULCER OF UNSPECIFIED PART OF RIGHT LOWER LEG WITH UNSPECIFIED SEVERITY: ICD-10-CM

## 2023-09-18 DIAGNOSIS — I87.2 VENOUS INSUFFICIENCY (CHRONIC) (PERIPHERAL): ICD-10-CM

## 2023-09-18 DIAGNOSIS — I87.333 CHRONIC VENOUS HYPERTENSION (IDIOPATHIC) WITH ULCER AND INFLAMMATION OF BILATERAL LOWER EXTREMITY: ICD-10-CM

## 2023-09-18 DIAGNOSIS — L97.929 NON-PRESSURE CHRONIC ULCER OF UNSPECIFIED PART OF LEFT LOWER LEG WITH UNSPECIFIED SEVERITY: ICD-10-CM

## 2023-09-18 DIAGNOSIS — E11.9 TYPE 2 DIABETES MELLITUS WITHOUT COMPLICATIONS: ICD-10-CM

## 2023-09-25 ENCOUNTER — APPOINTMENT (OUTPATIENT)
Dept: WOUND CARE | Facility: CLINIC | Age: 78
End: 2023-09-25

## 2023-10-23 ENCOUNTER — TRANSCRIPTION ENCOUNTER (OUTPATIENT)
Age: 78
End: 2023-10-23

## 2023-10-25 ENCOUNTER — APPOINTMENT (OUTPATIENT)
Dept: CARDIOLOGY | Facility: CLINIC | Age: 78
End: 2023-10-25
Payer: MEDICARE

## 2023-10-25 ENCOUNTER — NON-APPOINTMENT (OUTPATIENT)
Age: 78
End: 2023-10-25

## 2023-10-25 VITALS
DIASTOLIC BLOOD PRESSURE: 79 MMHG | BODY MASS INDEX: 29.2 KG/M2 | HEART RATE: 71 BPM | WEIGHT: 204 LBS | HEIGHT: 70 IN | OXYGEN SATURATION: 100 % | SYSTOLIC BLOOD PRESSURE: 138 MMHG | RESPIRATION RATE: 16 BRPM

## 2023-10-25 DIAGNOSIS — I87.333 CHRONIC VENOUS HYPERTENSION (IDIOPATHIC) WITH ULCER AND INFLAMMATION OF BILATERAL LOWER EXTREMITY: ICD-10-CM

## 2023-10-25 DIAGNOSIS — Z13.6 ENCOUNTER FOR SCREENING FOR CARDIOVASCULAR DISORDERS: ICD-10-CM

## 2023-10-25 PROCEDURE — 93000 ELECTROCARDIOGRAM COMPLETE: CPT

## 2023-10-25 PROCEDURE — 99214 OFFICE O/P EST MOD 30 MIN: CPT

## 2023-10-30 DIAGNOSIS — E11.9 TYPE 2 DIABETES MELLITUS WITHOUT COMPLICATIONS: ICD-10-CM

## 2023-10-30 DIAGNOSIS — T14.90XA INJURY, UNSPECIFIED, INITIAL ENCOUNTER: ICD-10-CM

## 2023-10-30 DIAGNOSIS — I87.2 VENOUS INSUFFICIENCY (CHRONIC) (PERIPHERAL): ICD-10-CM

## 2023-12-01 RX ORDER — GABAPENTIN 100 MG/1
100 CAPSULE ORAL 3 TIMES DAILY
Qty: 270 | Refills: 3 | Status: ACTIVE | COMMUNITY
Start: 2023-06-23 | End: 1900-01-01

## 2023-12-08 RX ORDER — DAPAGLIFLOZIN 10 MG/1
10 TABLET, FILM COATED ORAL
Qty: 90 | Refills: 2 | Status: ACTIVE | COMMUNITY
Start: 1900-01-01 | End: 1900-01-01

## 2023-12-20 ENCOUNTER — APPOINTMENT (OUTPATIENT)
Dept: WOUND CARE | Facility: CLINIC | Age: 78
End: 2023-12-20

## 2023-12-21 ENCOUNTER — APPOINTMENT (OUTPATIENT)
Dept: VASCULAR SURGERY | Facility: CLINIC | Age: 78
End: 2023-12-21
Payer: MEDICARE

## 2023-12-21 ENCOUNTER — APPOINTMENT (OUTPATIENT)
Dept: VASCULAR SURGERY | Facility: CLINIC | Age: 78
End: 2023-12-21

## 2023-12-21 VITALS
WEIGHT: 198 LBS | HEIGHT: 70 IN | SYSTOLIC BLOOD PRESSURE: 153 MMHG | BODY MASS INDEX: 28.35 KG/M2 | HEART RATE: 73 BPM | DIASTOLIC BLOOD PRESSURE: 85 MMHG | TEMPERATURE: 98.2 F

## 2023-12-21 VITALS — DIASTOLIC BLOOD PRESSURE: 81 MMHG | SYSTOLIC BLOOD PRESSURE: 158 MMHG | HEART RATE: 65 BPM

## 2023-12-21 PROCEDURE — 99213 OFFICE O/P EST LOW 20 MIN: CPT

## 2023-12-21 PROCEDURE — 93970 EXTREMITY STUDY: CPT

## 2023-12-27 NOTE — ASSESSMENT
[FreeTextEntry1] :  A/P  Chronic venous insufficiency with history of venous stasis ulcers s/p multiple endovenous procedures. He now c/o persistent ankle swelling and discomfort in area of previously healed wounds. VLE with deep venous reflux and L distal GSV insufficiency. May consider L GSV ablation again, however, patient must continue with lifelong compression therapy including compression stockings and lymphedema pump.

## 2023-12-27 NOTE — PHYSICAL EXAM
[2+] : left 2+ [Calm] : calm [de-identified] : NAD [de-identified] : unlabored [FreeTextEntry1] : LE vein findings:  Varicosities (spider, reticular, varicose)  - bilateral  Edema - bilateral  Skin changes -  dry, flaky skin, stasis dermatitis, hyperpigmentation in the gator area Ulcer - healed ulcerations, no active wounds CEAP classification C5 Palpable DP pulses bilaterally

## 2023-12-27 NOTE — HISTORY OF PRESENT ILLNESS
[FreeTextEntry1] :  RONNY NAVARRETE (: 1945) is a 78 year old male who presents today for bilateral ankle pain.   He has a longstanding history of venous insufficiency and venous stasis ulcerations. He is s/p vein procedures: bilateral SSV RFA and distal GSV Varithena. Wounds healed with dedicated wound care.   Today he complains of a sticking and sharp pain in both ankles where he had the wounds. He also complains of severe dry skin in the same area. He is compliant with compression stockings and lymphedema pump. He uses Cetaphil lotion to moisturize every day.   2023  VLE negative for DVT/SVT.  Significant deep venous reflux bilaterally.  RLE with isolated reflux at SFJ and prox AGSV LLE with reflux throughout the GSV from distal thigh - calf (h/o previous Varithena 2023).

## 2024-02-05 ENCOUNTER — APPOINTMENT (OUTPATIENT)
Dept: ENDOCRINOLOGY | Facility: CLINIC | Age: 79
End: 2024-02-05
Payer: MEDICARE

## 2024-02-05 VITALS
HEART RATE: 60 BPM | DIASTOLIC BLOOD PRESSURE: 72 MMHG | SYSTOLIC BLOOD PRESSURE: 110 MMHG | HEIGHT: 70 IN | WEIGHT: 213 LBS | OXYGEN SATURATION: 99 % | BODY MASS INDEX: 30.49 KG/M2

## 2024-02-05 DIAGNOSIS — E11.9 TYPE 2 DIABETES MELLITUS W/OUT COMPLICATIONS: ICD-10-CM

## 2024-02-05 DIAGNOSIS — E11.40 TYPE 2 DIABETES MELLITUS WITH DIABETIC NEUROPATHY, UNSPECIFIED: ICD-10-CM

## 2024-02-05 DIAGNOSIS — Z79.01 LONG TERM (CURRENT) USE OF ANTICOAGULANTS: ICD-10-CM

## 2024-02-05 DIAGNOSIS — E03.9 HYPOTHYROIDISM, UNSPECIFIED: ICD-10-CM

## 2024-02-05 PROCEDURE — G2211 COMPLEX E/M VISIT ADD ON: CPT

## 2024-02-05 PROCEDURE — 83036 HEMOGLOBIN GLYCOSYLATED A1C: CPT | Mod: QW

## 2024-02-05 PROCEDURE — 99214 OFFICE O/P EST MOD 30 MIN: CPT

## 2024-02-05 RX ORDER — ATORVASTATIN CALCIUM 20 MG/1
20 TABLET, FILM COATED ORAL
Qty: 90 | Refills: 1 | Status: ACTIVE | COMMUNITY
Start: 2022-04-12 | End: 1900-01-01

## 2024-02-05 RX ORDER — BLOOD-GLUCOSE SENSOR
EACH MISCELLANEOUS
Qty: 6 | Refills: 3 | Status: DISCONTINUED | COMMUNITY
Start: 2023-08-15 | End: 2024-02-05

## 2024-02-05 RX ORDER — METFORMIN ER 500 MG 500 MG/1
500 TABLET ORAL
Qty: 180 | Refills: 2 | Status: DISCONTINUED | COMMUNITY
Start: 2021-10-01 | End: 2024-02-05

## 2024-02-05 NOTE — ASSESSMENT
[FreeTextEntry1] : Mr. RONNY NAVARRETE is a 79 year old man with history of Type 2 DM, here to establish care, A1C at goal of 6.6% today, with CKD stage 3A, non known hypertension, no known hyperlipdiemia, hypothyroidism, here to establish endocrine visit.  He was following up with Dr. Fernández but transferring care here as Dr. Fernández is no longer on his insurance network.    1.  Type 2 diabetes mellitus A1c 6.3% in July 2023 A1c 6.8% which is currently at goal Continue to hold metformin as he was getting dizzy spell. Continue Farxiga 10 mg once daily Check blood work today. Insurance not covering Dexcom G6 or vikram sensor because he is currently not on insulin.  2. Hypertension BP goal <1 30/80 Continue to follow-up with kidney doctor Check albumin/current ratio today Check CMP, Phos and magnesium level  3.  Hyperlipidemia Continue with atorvastatin 20 mg once daily, prescription sent to pharmacy of choice  4.  Hypothyroidism Check TFT today, currently on levothyroxine 75 mcg once daily, adjust dosage as needed.

## 2024-02-05 NOTE — QUALITY MEASURES
[Nephrology Follow-Up] : patient is currently receiving treatment via nephrology follow-up [NoLowerExtremityNeuroExam] : with compression stocking on

## 2024-02-05 NOTE — THERAPY
[Today's Date] : [unfilled] [FreeTextEntry7] : Farxiga 10mg once daily  hasn't been taking metformin ER 500mg for a long time

## 2024-02-05 NOTE — HISTORY OF PRESENT ILLNESS
[FreeTextEntry1] : Mr. RONNY NAVARRETE is 79  year old man with history of Type 2 DM diagnosed  He was following with Dr. Fernández but Dr. Fernández no longer takes his insurance therefore switching.  He was diagnosed with Type 2 DM for about 2012.   Past medication for DM: He doesn't recall completely  Patient is currently only taking Farxiga 10 mg once daily.  He is complaining of diabetic neuropathy.  He had an ulcer on his leg in the past on the right leg, now healed, but still with diabetic neuropathy.  He was prescribed gabapentin by his cardiologist.  Regarding atrial fibrillation, patient is on Eliquis and being followed by cardiologist.  Reporting that the medication is getting very expensive now that he is on Medicare part D plan.  Regarding hypothyroidism, currently on levothyroxine 75 mcg once daily.  He is up to date with his eye doctor.  He was having itching on his leg and he had ulcers.    Regarding hyperlipidemia, currently taking atorvastatin 20 mg once daily. DIET RECALL:  B: Usually has soup for breakfast, chicken or carrot soup.  S: most of the time no snack between breakfast and dinner L: Sometimes skips lunch, if he does eat lunch, usually have fruits, apple, pear or plum D: A little rice, salad.     Lives at home with his wife.   He is a retired .  He taught science.   He states that he does not have a history of hypertension.  He denies any history of hyperlipidemia.   He follows up with ophthalmologist once a year, he states that he has no history of diabetic retinoathy.   He follows up with podiatrist as well.    In Aug 2023, he had a pacemaker done.  Blood pressure gotten normal.  When he takes the metformin, he reprots very poor appetite.  So, he stopped taking the metformin.  He states that his swinging and so had eased up.  He managed an appointment with me to discuss it.

## 2024-02-08 LAB
ALBUMIN SERPL ELPH-MCNC: 4.3 G/DL
ALP BLD-CCNC: 94 U/L
ALT SERPL-CCNC: 16 U/L
ANION GAP SERPL CALC-SCNC: 9 MMOL/L
AST SERPL-CCNC: 20 U/L
BILIRUB SERPL-MCNC: 0.3 MG/DL
BUN SERPL-MCNC: 27 MG/DL
CALCIUM SERPL-MCNC: 9.4 MG/DL
CHLORIDE SERPL-SCNC: 106 MMOL/L
CHOLEST SERPL-MCNC: 124 MG/DL
CO2 SERPL-SCNC: 25 MMOL/L
CREAT SERPL-MCNC: 1.55 MG/DL
CREAT SPEC-SCNC: 52 MG/DL
EGFR: 46 ML/MIN/1.73M2
ESTIMATED AVERAGE GLUCOSE: 143 MG/DL
GLUCOSE SERPL-MCNC: 111 MG/DL
HBA1C MFR BLD HPLC: 6.6 %
HBA1C MFR BLD HPLC: 6.8
HDLC SERPL-MCNC: 43 MG/DL
LDLC SERPL CALC-MCNC: 62 MG/DL
MAGNESIUM SERPL-MCNC: 2.3 MG/DL
MICROALBUMIN 24H UR DL<=1MG/L-MCNC: 57.9 MG/DL
MICROALBUMIN/CREAT 24H UR-RTO: 1108 MG/G
NONHDLC SERPL-MCNC: 81 MG/DL
PHOSPHATE SERPL-MCNC: 3.7 MG/DL
POTASSIUM SERPL-SCNC: 5 MMOL/L
PROT SERPL-MCNC: 8.3 G/DL
SODIUM SERPL-SCNC: 141 MMOL/L
T4 FREE SERPL-MCNC: 1.9 NG/DL
TRIGL SERPL-MCNC: 100 MG/DL
TSH SERPL-ACNC: 0.62 UIU/ML

## 2024-02-16 ENCOUNTER — APPOINTMENT (OUTPATIENT)
Dept: ELECTROPHYSIOLOGY | Facility: CLINIC | Age: 79
End: 2024-02-16
Payer: MEDICARE

## 2024-02-16 ENCOUNTER — NON-APPOINTMENT (OUTPATIENT)
Age: 79
End: 2024-02-16

## 2024-02-16 VITALS
DIASTOLIC BLOOD PRESSURE: 88 MMHG | OXYGEN SATURATION: 100 % | WEIGHT: 213 LBS | HEART RATE: 98 BPM | HEIGHT: 70 IN | SYSTOLIC BLOOD PRESSURE: 151 MMHG | BODY MASS INDEX: 30.49 KG/M2

## 2024-02-16 PROCEDURE — 99213 OFFICE O/P EST LOW 20 MIN: CPT

## 2024-02-16 PROCEDURE — 93000 ELECTROCARDIOGRAM COMPLETE: CPT | Mod: 59

## 2024-02-16 PROCEDURE — G2211 COMPLEX E/M VISIT ADD ON: CPT

## 2024-02-16 PROCEDURE — 93279 PRGRMG DEV EVAL PM/LDLS PM: CPT

## 2024-02-16 NOTE — HISTORY OF PRESENT ILLNESS
[FreeTextEntry1] : Mr. RONNY NAVARRETE is a 79-year-old with a history of HLD, hypothyroid, DMII, permanent atrial fibrillation on Eliquis s/p DCCV in 2014 and now s/p MICRA placement on 8/2/2023 who is here today for routine f/u. Initially, after the pacemaker was placed, he felt less fatigued and was able to walk longer distances without dyspnea. More recently, he began to feel a lack of energy if he tried to exert himself too much. Been going on for 2-3 weeks now. There have been no viral illnesses recently and no dyspnea, CP, or syncope.  Does get lightheaded. He was not placed on any new medications. He has had the leadless pacemaker for 6.5 months now. His ulcers have healed up now and he has not had issues sleeping.  Does 10,000 steps on machine which has not changed.

## 2024-02-16 NOTE — DISCUSSION/SUMMARY
[FreeTextEntry1] : 1. AV block: ECG performed today to check for arrhythmias, demonstrated ventricular pacing at a rate of 81 bpm.   2. Fatigue: asked Mr. Mendenhall to return to Dr. Sanders's office to get an echocardiogram. 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  [EKG obtained to assist in diagnosis and management of assessed problem(s)] : EKG obtained to assist in diagnosis and management of assessed problem(s)

## 2024-04-10 NOTE — PHYSICAL EXAM

## 2024-04-24 ENCOUNTER — NON-APPOINTMENT (OUTPATIENT)
Age: 79
End: 2024-04-24

## 2024-04-24 ENCOUNTER — APPOINTMENT (OUTPATIENT)
Dept: CARDIOLOGY | Facility: CLINIC | Age: 79
End: 2024-04-24
Payer: MEDICARE

## 2024-04-24 VITALS
WEIGHT: 219 LBS | HEART RATE: 95 BPM | OXYGEN SATURATION: 100 % | DIASTOLIC BLOOD PRESSURE: 81 MMHG | HEIGHT: 70 IN | SYSTOLIC BLOOD PRESSURE: 156 MMHG | BODY MASS INDEX: 31.35 KG/M2

## 2024-04-24 VITALS — DIASTOLIC BLOOD PRESSURE: 63 MMHG | SYSTOLIC BLOOD PRESSURE: 119 MMHG

## 2024-04-24 DIAGNOSIS — I45.89 OTHER SPECIFIED CONDUCTION DISORDERS: ICD-10-CM

## 2024-04-24 DIAGNOSIS — E78.5 HYPERLIPIDEMIA, UNSPECIFIED: ICD-10-CM

## 2024-04-24 DIAGNOSIS — I48.21 PERMANENT ATRIAL FIBRILLATION: ICD-10-CM

## 2024-04-24 DIAGNOSIS — I48.0 PAROXYSMAL ATRIAL FIBRILLATION: ICD-10-CM

## 2024-04-24 DIAGNOSIS — R03.0 ELEVATED BLOOD-PRESSURE READING, W/OUT DIAGNOSIS OF HYPERTENSION: ICD-10-CM

## 2024-04-24 DIAGNOSIS — I87.2 VENOUS INSUFFICIENCY (CHRONIC) (PERIPHERAL): ICD-10-CM

## 2024-04-24 DIAGNOSIS — I44.30 UNSPECIFIED ATRIOVENTRICULAR BLOCK: ICD-10-CM

## 2024-04-24 DIAGNOSIS — I73.9 PERIPHERAL VASCULAR DISEASE, UNSPECIFIED: ICD-10-CM

## 2024-04-24 DIAGNOSIS — I48.91 UNSPECIFIED ATRIAL FIBRILLATION: ICD-10-CM

## 2024-04-24 PROCEDURE — 93000 ELECTROCARDIOGRAM COMPLETE: CPT

## 2024-04-24 PROCEDURE — G2211 COMPLEX E/M VISIT ADD ON: CPT

## 2024-04-24 PROCEDURE — 99214 OFFICE O/P EST MOD 30 MIN: CPT

## 2024-04-24 RX ORDER — LEVOTHYROXINE SODIUM 88 UG/1
88 TABLET ORAL DAILY
Qty: 90 | Refills: 3 | Status: ACTIVE | COMMUNITY
Start: 2022-06-28 | End: 1900-01-01

## 2024-08-05 ENCOUNTER — APPOINTMENT (OUTPATIENT)
Dept: ENDOCRINOLOGY | Facility: CLINIC | Age: 79
End: 2024-08-05

## 2024-08-05 PROCEDURE — G2211 COMPLEX E/M VISIT ADD ON: CPT

## 2024-08-05 PROCEDURE — 99214 OFFICE O/P EST MOD 30 MIN: CPT | Mod: GC

## 2024-08-05 NOTE — ASSESSMENT
[Diabetes Foot Care] : diabetes foot care [Importance of Diet and Exercise] : importance of diet and exercise to improve glycemic control, achieve weight loss and improve cardiovascular health [Retinopathy Screening] : Patient was referred to ophthalmology for retinopathy screening [FreeTextEntry1] : Mr. RONNY NAVARRETE is a 79 year old man with history of Type 2 DM, CKD, hypothyroidism here for follow up  1.  Type 2 diabetes mellitus A1c 6.5 today, at goal Continue Farxiga 10 mg once daily (patient reports very expensive now that he is on medicare part D) - will send as generic.  Has nephropathy, ACR 1108 2/2024, eGFR 46. Recheck ACR today- if remains very high, will reach out to nephrologist (Dr. De Guzman at [facility]) for possible ACE/ARB Insurance not covering Dexcom G6 or vikram sensor because he is currently not on insulin. Recommended to return to podiatry for neuropathy  2. Hypertension BP at goal <130/80 Continue to follow-up with kidney doctor Check albumin/current ratio today  3.  Hyperlipidemia Continue with atorvastatin 20 mg once daily check lipid panel today  4.  Hypothyroidism Check TFT today, currently on levothyroxine 75 mcg once daily, adjust dosage as needed.  Return in 6 months  Discussed and seen with Dr. Ely Max MD Endocrine Fellow

## 2024-08-05 NOTE — ASSESSMENT
[Diabetes Foot Care] : diabetes foot care [Importance of Diet and Exercise] : importance of diet and exercise to improve glycemic control, achieve weight loss and improve cardiovascular health [Retinopathy Screening] : Patient was referred to ophthalmology for retinopathy screening [FreeTextEntry1] : Mr. RONNY NAVARRETE is a 79 year old man with history of Type 2 DM, CKD, hypothyroidism here for follow up  1.  Type 2 diabetes mellitus A1c 6.5 today, at goal Continue Farxiga 10 mg once daily (patient reports very expensive now that he is on medicare part D) - will send as generic.  Has nephropathy, ACR 1108 2/2024, eGFR 46. Recheck ACR today- if remains very high, will reach out to nephrologist (Dr. De Guzman at [facility]) for possible ACE/ARB Insurance not covering Dexcom G6 or vikram sensor because he is currently not on insulin. Recommended to return to podiatry for neuropathy  2. Hypertension BP at goal <130/80 Continue to follow-up with kidney doctor Check albumin/current ratio today  3.  Hyperlipidemia Continue with atorvastatin 20 mg once daily check lipid panel today  4.  Hypothyroidism Check TFT today, currently on levothyroxine 75 mcg once daily, adjust dosage as needed.  Return in 6 months  Discussed and seen with Dr. Ely Max MD Endocrine Fellow

## 2024-08-05 NOTE — PHYSICAL EXAM
[Alert] : alert [Well Nourished] : well nourished [No Acute Distress] : no acute distress [Well Developed] : well developed [Normal Sclera/Conjunctiva] : normal sclera/conjunctiva [EOMI] : extra ocular movement intact [No Proptosis] : no proptosis [Normal Oropharynx] : the oropharynx was normal [Thyroid Not Enlarged] : the thyroid was not enlarged [No Thyroid Nodules] : no palpable thyroid nodules [No Respiratory Distress] : no respiratory distress [No Accessory Muscle Use] : no accessory muscle use [Clear to Auscultation] : lungs were clear to auscultation bilaterally [Normal S1, S2] : normal S1 and S2 [Normal Rate] : heart rate was normal [Regular Rhythm] : with a regular rhythm [No Edema] : no peripheral edema [Pedal Pulses Normal] : the pedal pulses are present [Normal Bowel Sounds] : normal bowel sounds [Not Tender] : non-tender [Not Distended] : not distended [Soft] : abdomen soft [Normal Anterior Cervical Nodes] : no anterior cervical lymphadenopathy [No Spinal Tenderness] : no spinal tenderness [Spine Straight] : spine straight [No Stigmata of Cushings Syndrome] : no stigmata of Cushings Syndrome [Normal Gait] : normal gait [Normal Strength/Tone] : muscle strength and tone were normal [No Rash] : no rash [Normal Reflexes] : deep tendon reflexes were 2+ and symmetric [No Tremors] : no tremors [Oriented x3] : oriented to person, place, and time [Obese] : obese [Acanthosis Nigricans] : no acanthosis nigricans

## 2024-08-05 NOTE — HISTORY OF PRESENT ILLNESS
[FreeTextEntry1] : Mr. RONNY NAVARRETE is 79 year old man with history of Type 2 DM   He was diagnosed with Type 2 DM for about 2012.   He was previously following with Dr. Fernández but Dr. Fernández no longer takes his insurance therefore switched.  Past medication for DM: metformin, had dizziness  A1c today is 6.5% Patient is currently only taking Farxiga 10 mg once daily. Reporting that medication is very expensive now that he is on medicare part D plan. SMBG: checking twice a week fasting AM: 90-110s He has nephropathy. Last ACR 1108 2/2024. He is seeing Dr. De Guzman nephrologist at [facility]. Not on ACE/ARB He is complaining of diabetic neuropathy.  He had an ulcer on his leg in the past on the right leg which has long healed, but still with diabetic neuropathy. He was prescribed gabapentin by his cardiologist. Saw john 3 months ago, reports everything was normal  Regarding hyperlipidemia, currently taking atorvastatin 20 mg once daily. Last LDL was 62 2/2024.  Regarding hypothyroidism, currently on levothyroxine 75 mcg once daily.TSH 0.62 2/2024  Regarding atrial fibrillation, patient is on Eliquis and being followed by cardiologist.  Reporting that the medication is getting very expensive now that he is on Medicare part D plan.   Lives at home with his wife.  He is a retired .  He taught science.

## 2024-08-05 NOTE — END OF VISIT
[] : Fellow [FreeTextEntry3] : 79-year-old man with history of type 2 diabetes, currently treated with Farxiga 10 mg once daily, A1c 6.6% February 2020 T4, A1c today 6.5% 8/5/2024. Continue with Farxiga 10 mg once daily.  Will change to generic version dapagliflozin due to cost of the medication. Significantly elevated albumin/creatinine ratio, recommend repeating ACR today.  Will reach out to nephrologist regarding starting ACE or ARB given significant albuminuria.  But I believe he needs further workup for significant elevated protein level. BP goal <1 30/80, continue to follow-up with nephrologist. For hyperlipidemia, continue with atorvastatin 20 mg once daily and check fasting lipid profile. For hypothyroidism, clinically euthyroid, check TFT today, adjust levothyroxine as needed.  Currently on levothyroxine 75 mcg once daily.

## 2024-08-05 NOTE — HISTORY OF PRESENT ILLNESS
[FreeTextEntry1] : Mr. RONNY NAVARRETE is 79 year old man with history of Type 2 DM   He was diagnosed with Type 2 DM for about 2012.   He was previously following with Dr. Fernández but Dr. Fernández no longer takes his insurance therefore switched.  Past medication for DM: metformin, had dizziness  A1c today is 6.5% Patient is currently only taking Farxiga 10 mg once daily. Reporting that medication is very expensive now that he is on medicare part D plan. SMBG: checking twice a week fasting AM: 90-110s He has nephropathy. Last ACR 1108 2/2024. He is seeing Dr. De Guzman nephrologist at [facility]. Not on ACE/ARB He is complaining of diabetic neuropathy.  He had an ulcer on his leg in the past on the right leg which has long healed, but still with diabetic neuropathy. He was prescribed gabapentin by his cardiologist. Saw john 3 months ago, reports everything was normal  Regarding hyperlipidemia, currently taking atorvastatin 20 mg once daily. Last LDL was 62 2/2024.  Regarding hypothyroidism, currently on levothyroxine 75 mcg once daily.TSH 0.62 2/2024  Regarding atrial fibrillation, patient is on Eliquis and being followed by cardiologist.  Reporting that the medication is getting very expensive now that he is on Medicare part D plan.   Lives at home with his wife.  He is a retired .  He taught science.

## 2024-08-16 ENCOUNTER — APPOINTMENT (OUTPATIENT)
Dept: ELECTROPHYSIOLOGY | Facility: CLINIC | Age: 79
End: 2024-08-16

## 2024-08-16 ENCOUNTER — NON-APPOINTMENT (OUTPATIENT)
Age: 79
End: 2024-08-16

## 2024-08-16 VITALS
SYSTOLIC BLOOD PRESSURE: 110 MMHG | HEART RATE: 82 BPM | OXYGEN SATURATION: 96 % | BODY MASS INDEX: 31.64 KG/M2 | WEIGHT: 221 LBS | HEIGHT: 70 IN | DIASTOLIC BLOOD PRESSURE: 68 MMHG

## 2024-08-16 DIAGNOSIS — R53.83 OTHER MALAISE: ICD-10-CM

## 2024-08-16 DIAGNOSIS — R53.81 OTHER MALAISE: ICD-10-CM

## 2024-08-16 DIAGNOSIS — I48.91 UNSPECIFIED ATRIAL FIBRILLATION: ICD-10-CM

## 2024-08-16 DIAGNOSIS — I44.30 UNSPECIFIED ATRIOVENTRICULAR BLOCK: ICD-10-CM

## 2024-08-16 DIAGNOSIS — Z95.0 PRESENCE OF CARDIAC PACEMAKER: ICD-10-CM

## 2024-08-16 PROCEDURE — G2211 COMPLEX E/M VISIT ADD ON: CPT

## 2024-08-16 PROCEDURE — 93000 ELECTROCARDIOGRAM COMPLETE: CPT | Mod: 59

## 2024-08-16 PROCEDURE — 93279 PRGRMG DEV EVAL PM/LDLS PM: CPT

## 2024-08-16 PROCEDURE — 99213 OFFICE O/P EST LOW 20 MIN: CPT

## 2024-08-16 NOTE — HISTORY OF PRESENT ILLNESS
[FreeTextEntry1] : Tyler Mendenhall is a 80 y/o man with a history of HLD, hypothyroid, DMII, permanent atrial fibrillation on Eliquis s/p DCCV in 2014 and now s/p AVEIR leadless PPM placement on 8/2/2023 who is here today for routine f/u. He is still struggling with some fatigue. Saw Dr Ward and underwent ECHO which was reportedly normal. No new or changed medications. Also notes some orthostasis, feels a little lightheaded when he looks up from bending down. Denies chest pain, palpitations, SOB, syncope or near syncope.

## 2024-08-16 NOTE — DISCUSSION/SUMMARY
[FreeTextEntry1] : Impression:  1. Permanent afib with AV block: s/p AVEIR leadless PPM placement. ECG performed today to check for appropriate pacing and reveals ventricular pacing. Resume Eliquis for thromboembolic prophylaxis.   2. Orthostatic lightheadedness: BP normal at rest.  Could be from diabetic autonomic neuropathy.   [EKG obtained to assist in diagnosis and management of assessed problem(s)] : EKG obtained to assist in diagnosis and management of assessed problem(s)

## 2024-10-23 ENCOUNTER — APPOINTMENT (OUTPATIENT)
Dept: CARDIOLOGY | Facility: CLINIC | Age: 79
End: 2024-10-23
Payer: MEDICARE

## 2024-10-23 ENCOUNTER — NON-APPOINTMENT (OUTPATIENT)
Age: 79
End: 2024-10-23

## 2024-10-23 VITALS
DIASTOLIC BLOOD PRESSURE: 71 MMHG | WEIGHT: 221 LBS | SYSTOLIC BLOOD PRESSURE: 132 MMHG | HEART RATE: 73 BPM | BODY MASS INDEX: 31.64 KG/M2 | OXYGEN SATURATION: 98 % | HEIGHT: 70 IN

## 2024-10-23 DIAGNOSIS — E78.5 HYPERLIPIDEMIA, UNSPECIFIED: ICD-10-CM

## 2024-10-23 DIAGNOSIS — R06.09 OTHER FORMS OF DYSPNEA: ICD-10-CM

## 2024-10-23 DIAGNOSIS — I48.0 PAROXYSMAL ATRIAL FIBRILLATION: ICD-10-CM

## 2024-10-23 DIAGNOSIS — Z95.0 PRESENCE OF CARDIAC PACEMAKER: ICD-10-CM

## 2024-10-23 DIAGNOSIS — I48.91 UNSPECIFIED ATRIAL FIBRILLATION: ICD-10-CM

## 2024-10-23 DIAGNOSIS — I87.2 VENOUS INSUFFICIENCY (CHRONIC) (PERIPHERAL): ICD-10-CM

## 2024-10-23 PROCEDURE — G2211 COMPLEX E/M VISIT ADD ON: CPT

## 2024-10-23 PROCEDURE — 93000 ELECTROCARDIOGRAM COMPLETE: CPT

## 2024-10-23 PROCEDURE — 99214 OFFICE O/P EST MOD 30 MIN: CPT

## 2024-11-22 ENCOUNTER — APPOINTMENT (OUTPATIENT)
Dept: ELECTROPHYSIOLOGY | Facility: CLINIC | Age: 79
End: 2024-11-22

## 2024-12-10 ENCOUNTER — RESULT REVIEW (OUTPATIENT)
Age: 79
End: 2024-12-10

## 2024-12-10 ENCOUNTER — APPOINTMENT (OUTPATIENT)
Dept: CV DIAGNOSTICS | Facility: HOSPITAL | Age: 79
End: 2024-12-10

## 2024-12-10 ENCOUNTER — APPOINTMENT (OUTPATIENT)
Dept: CV DIAGNOSITCS | Facility: HOSPITAL | Age: 79
End: 2024-12-10

## 2024-12-10 ENCOUNTER — OUTPATIENT (OUTPATIENT)
Dept: OUTPATIENT SERVICES | Facility: HOSPITAL | Age: 79
LOS: 1 days | End: 2024-12-10
Payer: MEDICARE

## 2024-12-10 DIAGNOSIS — Z98.890 OTHER SPECIFIED POSTPROCEDURAL STATES: Chronic | ICD-10-CM

## 2024-12-10 DIAGNOSIS — I48.91 UNSPECIFIED ATRIAL FIBRILLATION: ICD-10-CM

## 2024-12-10 DIAGNOSIS — R06.09 OTHER FORMS OF DYSPNEA: ICD-10-CM

## 2024-12-10 PROCEDURE — 93018 CV STRESS TEST I&R ONLY: CPT | Mod: GC,MC

## 2024-12-10 PROCEDURE — 93306 TTE W/DOPPLER COMPLETE: CPT | Mod: 26

## 2024-12-10 PROCEDURE — 78451 HT MUSCLE IMAGE SPECT SING: CPT | Mod: 26,MC

## 2024-12-10 PROCEDURE — 93016 CV STRESS TEST SUPVJ ONLY: CPT | Mod: GC,MC

## 2025-02-14 ENCOUNTER — APPOINTMENT (OUTPATIENT)
Dept: ELECTROPHYSIOLOGY | Facility: CLINIC | Age: 80
End: 2025-02-14

## 2025-02-25 ENCOUNTER — APPOINTMENT (OUTPATIENT)
Dept: ENDOCRINOLOGY | Facility: CLINIC | Age: 80
End: 2025-02-25
Payer: MEDICARE

## 2025-02-25 DIAGNOSIS — E11.40 TYPE 2 DIABETES MELLITUS WITH DIABETIC NEUROPATHY, UNSPECIFIED: ICD-10-CM

## 2025-02-25 DIAGNOSIS — E78.5 HYPERLIPIDEMIA, UNSPECIFIED: ICD-10-CM

## 2025-02-25 DIAGNOSIS — E11.9 TYPE 2 DIABETES MELLITUS W/OUT COMPLICATIONS: ICD-10-CM

## 2025-02-25 DIAGNOSIS — E87.5 HYPERKALEMIA: ICD-10-CM

## 2025-02-25 DIAGNOSIS — E03.9 HYPOTHYROIDISM, UNSPECIFIED: ICD-10-CM

## 2025-02-25 PROCEDURE — 99214 OFFICE O/P EST MOD 30 MIN: CPT | Mod: 2W

## 2025-02-25 PROCEDURE — G2211 COMPLEX E/M VISIT ADD ON: CPT | Mod: 2W

## 2025-02-26 LAB
ALBUMIN SERPL ELPH-MCNC: 4 G/DL
ALP BLD-CCNC: 83 U/L
ALT SERPL-CCNC: 13 U/L
ANION GAP SERPL CALC-SCNC: 10 MMOL/L
AST SERPL-CCNC: 20 U/L
BILIRUB SERPL-MCNC: 0.4 MG/DL
BUN SERPL-MCNC: 26 MG/DL
CALCIUM SERPL-MCNC: 9.2 MG/DL
CHLORIDE SERPL-SCNC: 108 MMOL/L
CHOLEST SERPL-MCNC: 157 MG/DL
CO2 SERPL-SCNC: 22 MMOL/L
CREAT SERPL-MCNC: 1.95 MG/DL
CREAT SPEC-SCNC: 117 MG/DL
EGFR: 35 ML/MIN/1.73M2
ESTIMATED AVERAGE GLUCOSE: 154 MG/DL
GLUCOSE SERPL-MCNC: 113 MG/DL
HBA1C MFR BLD HPLC: 7 %
HDLC SERPL-MCNC: 41 MG/DL
LDLC SERPL CALC-MCNC: 88 MG/DL
MICROALBUMIN 24H UR DL<=1MG/L-MCNC: 32 MG/DL
MICROALBUMIN/CREAT 24H UR-RTO: 273 MG/G
NONHDLC SERPL-MCNC: 116 MG/DL
POTASSIUM SERPL-SCNC: 5.2 MMOL/L
PROT SERPL-MCNC: 8 G/DL
SODIUM SERPL-SCNC: 140 MMOL/L
T4 FREE SERPL-MCNC: 1.1 NG/DL
TRIGL SERPL-MCNC: 166 MG/DL
TSH SERPL-ACNC: 4.54 UIU/ML

## 2025-02-27 LAB — ALDOSTERONE SERUM: 5.8 NG/DL

## 2025-03-06 LAB — RENIN ACTIVITY, PLASMA: 6.96 NG/ML/HR

## 2025-03-07 ENCOUNTER — NON-APPOINTMENT (OUTPATIENT)
Age: 80
End: 2025-03-07

## 2025-03-07 ENCOUNTER — APPOINTMENT (OUTPATIENT)
Dept: ELECTROPHYSIOLOGY | Facility: CLINIC | Age: 80
End: 2025-03-07
Payer: MEDICARE

## 2025-03-07 VITALS
SYSTOLIC BLOOD PRESSURE: 133 MMHG | BODY MASS INDEX: 31.64 KG/M2 | TEMPERATURE: 98 F | HEIGHT: 70 IN | OXYGEN SATURATION: 98 % | HEART RATE: 65 BPM | WEIGHT: 221 LBS | DIASTOLIC BLOOD PRESSURE: 70 MMHG

## 2025-03-07 DIAGNOSIS — Z95.0 PRESENCE OF CARDIAC PACEMAKER: ICD-10-CM

## 2025-03-07 DIAGNOSIS — I48.21 PERMANENT ATRIAL FIBRILLATION: ICD-10-CM

## 2025-03-07 PROCEDURE — 99213 OFFICE O/P EST LOW 20 MIN: CPT

## 2025-03-07 PROCEDURE — 93000 ELECTROCARDIOGRAM COMPLETE: CPT

## 2025-03-07 PROCEDURE — G2211 COMPLEX E/M VISIT ADD ON: CPT

## 2025-04-23 ENCOUNTER — NON-APPOINTMENT (OUTPATIENT)
Age: 80
End: 2025-04-23

## 2025-04-23 ENCOUNTER — APPOINTMENT (OUTPATIENT)
Dept: CARDIOLOGY | Facility: CLINIC | Age: 80
End: 2025-04-23
Payer: MEDICARE

## 2025-04-23 VITALS
HEIGHT: 70 IN | HEART RATE: 90 BPM | SYSTOLIC BLOOD PRESSURE: 134 MMHG | DIASTOLIC BLOOD PRESSURE: 77 MMHG | WEIGHT: 231 LBS | BODY MASS INDEX: 33.07 KG/M2 | OXYGEN SATURATION: 100 %

## 2025-04-23 VITALS — DIASTOLIC BLOOD PRESSURE: 79 MMHG | SYSTOLIC BLOOD PRESSURE: 128 MMHG

## 2025-04-23 DIAGNOSIS — I87.2 VENOUS INSUFFICIENCY (CHRONIC) (PERIPHERAL): ICD-10-CM

## 2025-04-23 DIAGNOSIS — E78.5 HYPERLIPIDEMIA, UNSPECIFIED: ICD-10-CM

## 2025-04-23 DIAGNOSIS — I48.0 PAROXYSMAL ATRIAL FIBRILLATION: ICD-10-CM

## 2025-04-23 DIAGNOSIS — Z13.6 ENCOUNTER FOR SCREENING FOR CARDIOVASCULAR DISORDERS: ICD-10-CM

## 2025-04-23 DIAGNOSIS — I48.21 PERMANENT ATRIAL FIBRILLATION: ICD-10-CM

## 2025-04-23 DIAGNOSIS — Z95.0 PRESENCE OF CARDIAC PACEMAKER: ICD-10-CM

## 2025-04-23 DIAGNOSIS — I44.30 UNSPECIFIED ATRIOVENTRICULAR BLOCK: ICD-10-CM

## 2025-04-23 DIAGNOSIS — I48.91 UNSPECIFIED ATRIAL FIBRILLATION: ICD-10-CM

## 2025-04-23 PROCEDURE — 99214 OFFICE O/P EST MOD 30 MIN: CPT

## 2025-04-23 PROCEDURE — G2211 COMPLEX E/M VISIT ADD ON: CPT

## 2025-04-23 PROCEDURE — 93000 ELECTROCARDIOGRAM COMPLETE: CPT

## 2025-05-13 ENCOUNTER — RX RENEWAL (OUTPATIENT)
Age: 80
End: 2025-05-13

## 2025-05-13 RX ORDER — LEVOTHYROXINE SODIUM 0.09 MG/1
88 TABLET ORAL
Qty: 90 | Refills: 0 | Status: ACTIVE | COMMUNITY
Start: 2025-05-13 | End: 1900-01-01

## 2025-09-17 ENCOUNTER — RX RENEWAL (OUTPATIENT)
Age: 80
End: 2025-09-17

## 2025-09-19 ENCOUNTER — APPOINTMENT (OUTPATIENT)
Dept: ELECTROPHYSIOLOGY | Facility: CLINIC | Age: 80
End: 2025-09-19
Payer: MEDICARE

## 2025-09-19 ENCOUNTER — APPOINTMENT (OUTPATIENT)
Dept: RADIOLOGY | Facility: HOSPITAL | Age: 80
End: 2025-09-19

## 2025-09-19 ENCOUNTER — NON-APPOINTMENT (OUTPATIENT)
Age: 80
End: 2025-09-19

## 2025-09-19 VITALS
HEART RATE: 60 BPM | RESPIRATION RATE: 16 BRPM | DIASTOLIC BLOOD PRESSURE: 70 MMHG | SYSTOLIC BLOOD PRESSURE: 141 MMHG | OXYGEN SATURATION: 99 %

## 2025-09-19 DIAGNOSIS — R06.09 OTHER FORMS OF DYSPNEA: ICD-10-CM

## 2025-09-19 DIAGNOSIS — I44.30 UNSPECIFIED ATRIOVENTRICULAR BLOCK: ICD-10-CM

## 2025-09-19 DIAGNOSIS — I48.91 UNSPECIFIED ATRIAL FIBRILLATION: ICD-10-CM

## 2025-09-19 DIAGNOSIS — Z95.0 PRESENCE OF CARDIAC PACEMAKER: ICD-10-CM

## 2025-09-19 PROCEDURE — 99214 OFFICE O/P EST MOD 30 MIN: CPT

## 2025-09-19 PROCEDURE — 93000 ELECTROCARDIOGRAM COMPLETE: CPT
